# Patient Record
Sex: MALE | Race: WHITE | Employment: FULL TIME | URBAN - METROPOLITAN AREA
[De-identification: names, ages, dates, MRNs, and addresses within clinical notes are randomized per-mention and may not be internally consistent; named-entity substitution may affect disease eponyms.]

---

## 2021-09-08 ENCOUNTER — OFFICE VISIT (OUTPATIENT)
Dept: FAMILY MEDICINE CLINIC | Age: 51
End: 2021-09-08
Payer: COMMERCIAL

## 2021-09-08 VITALS
OXYGEN SATURATION: 97 % | DIASTOLIC BLOOD PRESSURE: 88 MMHG | WEIGHT: 234 LBS | BODY MASS INDEX: 33.5 KG/M2 | RESPIRATION RATE: 18 BRPM | TEMPERATURE: 97.9 F | HEIGHT: 70 IN | HEART RATE: 97 BPM | SYSTOLIC BLOOD PRESSURE: 136 MMHG

## 2021-09-08 DIAGNOSIS — R35.0 URINARY FREQUENCY: ICD-10-CM

## 2021-09-08 DIAGNOSIS — E11.9 NEW ONSET TYPE 2 DIABETES MELLITUS (HCC): Primary | ICD-10-CM

## 2021-09-08 DIAGNOSIS — Z12.5 SCREENING FOR PROSTATE CANCER: ICD-10-CM

## 2021-09-08 DIAGNOSIS — R05.9 COUGH: ICD-10-CM

## 2021-09-08 LAB
BILIRUBIN, POC: ABNORMAL
BLOOD URINE, POC: ABNORMAL
CLARITY, POC: CLEAR
COLOR, POC: ABNORMAL
GLUCOSE URINE, POC: ABNORMAL
HBA1C MFR BLD: 11.1 %
KETONES, POC: ABNORMAL
LEUKOCYTE EST, POC: ABNORMAL
NITRITE, POC: ABNORMAL
PH, POC: 5.5
PROTEIN, POC: ABNORMAL
SPECIFIC GRAVITY, POC: 1.03
UROBILINOGEN, POC: ABNORMAL

## 2021-09-08 PROCEDURE — 81003 URINALYSIS AUTO W/O SCOPE: CPT | Performed by: NURSE PRACTITIONER

## 2021-09-08 PROCEDURE — 83036 HEMOGLOBIN GLYCOSYLATED A1C: CPT | Performed by: NURSE PRACTITIONER

## 2021-09-08 PROCEDURE — 99204 OFFICE O/P NEW MOD 45 MIN: CPT | Performed by: NURSE PRACTITIONER

## 2021-09-08 RX ORDER — GLUCOSAMINE HCL/CHONDROITIN SU 500-400 MG
CAPSULE ORAL
Qty: 100 STRIP | Refills: 5 | Status: SHIPPED | OUTPATIENT
Start: 2021-09-08

## 2021-09-08 RX ORDER — LANCETS 30 GAUGE
EACH MISCELLANEOUS
Qty: 1 EACH | Refills: 5 | Status: SHIPPED | OUTPATIENT
Start: 2021-09-08

## 2021-09-08 RX ORDER — GLIPIZIDE AND METFORMIN HCL 2.5; 5 MG/1; MG/1
1 TABLET, FILM COATED ORAL
Qty: 60 TABLET | Refills: 3 | Status: SHIPPED | OUTPATIENT
Start: 2021-09-08 | End: 2022-01-13

## 2021-09-08 SDOH — ECONOMIC STABILITY: FOOD INSECURITY: WITHIN THE PAST 12 MONTHS, THE FOOD YOU BOUGHT JUST DIDN'T LAST AND YOU DIDN'T HAVE MONEY TO GET MORE.: NEVER TRUE

## 2021-09-08 SDOH — ECONOMIC STABILITY: FOOD INSECURITY: WITHIN THE PAST 12 MONTHS, YOU WORRIED THAT YOUR FOOD WOULD RUN OUT BEFORE YOU GOT MONEY TO BUY MORE.: NEVER TRUE

## 2021-09-08 ASSESSMENT — PATIENT HEALTH QUESTIONNAIRE - PHQ9
SUM OF ALL RESPONSES TO PHQ QUESTIONS 1-9: 0
SUM OF ALL RESPONSES TO PHQ QUESTIONS 1-9: 0
2. FEELING DOWN, DEPRESSED OR HOPELESS: 0
SUM OF ALL RESPONSES TO PHQ9 QUESTIONS 1 & 2: 0
1. LITTLE INTEREST OR PLEASURE IN DOING THINGS: 0
SUM OF ALL RESPONSES TO PHQ QUESTIONS 1-9: 0

## 2021-09-08 ASSESSMENT — SOCIAL DETERMINANTS OF HEALTH (SDOH): HOW HARD IS IT FOR YOU TO PAY FOR THE VERY BASICS LIKE FOOD, HOUSING, MEDICAL CARE, AND HEATING?: NOT HARD AT ALL

## 2021-09-08 ASSESSMENT — ENCOUNTER SYMPTOMS
GASTROINTESTINAL NEGATIVE: 1
WHEEZING: 0
VISUAL CHANGE: 0
BACK PAIN: 0
EYES NEGATIVE: 1
SHORTNESS OF BREATH: 0
COUGH: 1
BLURRED VISION: 0

## 2021-09-08 NOTE — PROGRESS NOTES
Subjective:     Patient ID: Asia Peterson is a 53 y.o. male who presentstoday for:  Chief Complaint   Patient presents with   [address]     Pt. is here to establish care today. Pt. states over the last 3 weeks he has had excessive thirst, urinary frequency, nauseas and sour taste in his mouth. Pt. also has c/o cough, he is a smoker of over 20 years, he c/o chest tightness from coughing. Diabetes  He presents for his initial diabetic visit. There are no hypoglycemic associated symptoms. Pertinent negatives for hypoglycemia include no dizziness or headaches. Associated symptoms include fatigue, polydipsia, polyphagia and polyuria. Pertinent negatives for diabetes include no blurred vision, no chest pain, no foot paresthesias, no foot ulcerations, no visual change, no weakness and no weight loss. There are no hypoglycemic complications. There are no diabetic complications. Risk factors for coronary artery disease include male sex, obesity and tobacco exposure. When asked about current treatments, none were reported. His weight is stable. When asked about meal planning, he reported none. No past medical history on file. No current outpatient medications on file prior to visit. No current facility-administered medications on file prior to visit. No past surgical history on file. No family history on file.   Social History     Socioeconomic History    Marital status:      Spouse name: Not on file    Number of children: Not on file    Years of education: Not on file    Highest education level: Not on file   Occupational History    Not on file   Tobacco Use    Smoking status: Current Every Day Smoker     Packs/day: 1.50     Years: 25.00     Pack years: 37.50     Types: Cigarettes    Smokeless tobacco: Never Used   Substance and Sexual Activity    Alcohol use: Not on file    Drug use: Not on file    Sexual activity: Not on file   Other Topics Concern    Not on file   Social History Narrative    Not on file     Social Determinants of Health     Financial Resource Strain: Low Risk     Difficulty of Paying Living Expenses: Not hard at all   Food Insecurity: No Food Insecurity    Worried About Running Out of Food in the Last Year: Never true    [address] in the Last Year: Never true   Transportation Needs:     Lack of Transportation (Medical):  Lack of Transportation (Non-Medical):    Physical Activity:     Days of Exercise per Week:     Minutes of Exercise per Session:    Stress:     Feeling of Stress :    Social Connections:     Frequency of Communication with Friends and Family:     Frequency of Social Gatherings with Friends and Family:     Attends Sabianism Services:     Active Member of Clubs or Organizations:     Attends Club or Organization Meetings:     Marital Status:    Intimate Partner Violence:     Fear of Current or Ex-Partner:     Emotionally Abused:     Physically Abused:     Sexually Abused: Allergies:  Penicillins    Review of Systems   Constitutional: Positive for fatigue. Negative for activity change, appetite change, chills, diaphoresis, fever, unexpected weight change and weight loss. HENT: Negative. Eyes: Negative. Negative for blurred vision. Respiratory: Positive for cough. Negative for shortness of breath and wheezing. Cardiovascular: Negative for chest pain, palpitations and leg swelling. Gastrointestinal: Negative. Endocrine: Positive for polydipsia, polyphagia and polyuria. Genitourinary: Negative. Musculoskeletal: Negative for back pain, gait problem and joint swelling. Skin: Negative. Neurological: Negative for dizziness, syncope, weakness, light-headedness and headaches. Psychiatric/Behavioral: Negative.         Objective:    /88 (Site: Right Upper Arm, Position: Sitting, Cuff Size: Medium Adult)   Pulse 97   Temp 97.9 °F (36.6 °C) (Oral)   Resp 18   Ht 5' 10\" (1.778 m)   Wt 234 lb (106.1 kg)   SpO2 97%   BMI 33.58 kg/m²     Physical Exam  Constitutional:       General: He is not in acute distress. Appearance: Normal appearance. He is not toxic-appearing. HENT:      Head: Normocephalic and atraumatic. Mouth/Throat:      Mouth: Mucous membranes are moist.   Eyes:      Extraocular Movements: Extraocular movements intact. Conjunctiva/sclera: Conjunctivae normal.      Pupils: Pupils are equal, round, and reactive to light. Cardiovascular:      Rate and Rhythm: Normal rate and regular rhythm. Heart sounds: Normal heart sounds. Pulmonary:      Effort: Pulmonary effort is normal. No respiratory distress. Breath sounds: Normal breath sounds. No stridor. No wheezing, rhonchi or rales. Abdominal:      General: Bowel sounds are normal.      Palpations: Abdomen is soft. Tenderness: There is no abdominal tenderness. Musculoskeletal:         General: Normal range of motion. Cervical back: Normal range of motion and neck supple. No tenderness. Right lower leg: No edema. Left lower leg: No edema. Lymphadenopathy:      Cervical: No cervical adenopathy. Skin:     General: Skin is warm and dry. Findings: No erythema. Neurological:      General: No focal deficit present. Mental Status: He is alert and oriented to person, place, and time. Cranial Nerves: No cranial nerve deficit. Motor: No weakness. Gait: Gait normal.   Psychiatric:         Mood and Affect: Mood normal.         Behavior: Behavior normal.         Thought Content: Thought content normal.         Assessment & Plan:       Diagnosis Orders   1.  New onset type 2 diabetes mellitus (HCC)  POCT glycosylated hemoglobin (Hb A1C)    blood glucose monitor kit and supplies    blood glucose monitor strips    Lancets MISC    glipiZIDE-metFORMIN (METAGLIP) 2.5-500 MG per tablet    CBC Auto Differential    Comprehensive Metabolic Panel    Lipid Panel    [facility] 2. Cough  XR CHEST STANDARD (2 VW)   3. Urinary frequency  POCT glycosylated hemoglobin (Hb A1C)    POCT Urinalysis No Micro (Auto)    Culture, Urine   4. Screening for prostate cancer  PSA Screening     Orders Placed This Encounter   Procedures    Culture, Urine     Standing Status:   Future     Standing Expiration Date:   9/8/2022     Order Specific Question:   Specify (ex-cath, midstream, cysto, etc)? Answer:   MIDSTREAM    XR CHEST STANDARD (2 VW)     Standing Status:   Future     Standing Expiration Date:   9/8/2022    CBC Auto Differential     Standing Status:   Future     Standing Expiration Date:   9/8/2022    Comprehensive Metabolic Panel     Standing Status:   Future     Standing Expiration Date:   9/8/2022    Lipid Panel     Standing Status:   Future     Standing Expiration Date:   9/8/2022     Order Specific Question:   Is Patient Fasting?/# of Hours     Answer:   8    PSA Screening     Standing Status:   Future     Standing Expiration Date:   9/8/2022   [facility], [place]     Referral Priority:   Routine     Referral Type:   Eval and Treat     Referral Reason:   Specialty Services Required     Number of Visits Requested:   1    POCT glycosylated hemoglobin (Hb A1C)    POCT Urinalysis No Micro (Auto)     Orders Placed This Encounter   Medications    blood glucose monitor kit and supplies     Sig: Dispense sufficient amount for indicated testing frequency plus additional to accommodate PRN testing needs. Dispense all needed supplies to include: monitor, strips, lancing device, lancets, control solutions, alcohol swabs. Dispense:  1 kit     Refill:  0     Brand per patient preference. May round up to next available package size.  blood glucose monitor strips     Sig: Test 1 times a day & as needed for symptoms of irregular blood glucose. Dispense sufficient amount for indicated testing frequency plus additional to accommodate PRN testing needs.      Dispense:  100 strip Refill:  5     Brand per patient preference. May round up to next available package size.  Lancets MISC     Sig: DX: diabetes mellitus. Use 1-3 time(s) daily - Ok to substitute per insurance (1 each = 1 box)     Dispense:  1 each     Refill:  5    glipiZIDE-metFORMIN (METAGLIP) 2.5-500 MG per tablet     Sig: Take 1 tablet by mouth 2 times daily (before meals)     Dispense:  60 tablet     Refill:  3     There are no discontinued medications. Return in about 4 weeks (around 10/6/2021). Reviewed with the patient: currentclinical status, medications, activities and diet. Side effects, adverse effects of the medicationprescribed today, as well as treatment plan/ rationale and result expectations havebeen discussed with the patient who expresses understanding and desires to proceed. Pt instructions reviewed and given to patient.     Close follow up to evaluate treatment resultsand for coordination of care. I have reviewed the patient's medical historyin detail and updated the computerized patient record.     Victor Manuel Stahl, APRN - CNP

## 2021-09-14 DIAGNOSIS — R06.02 SOB (SHORTNESS OF BREATH): Primary | ICD-10-CM

## 2021-09-28 ENCOUNTER — OFFICE VISIT (OUTPATIENT)
Dept: FAMILY MEDICINE CLINIC | Age: 51
End: 2021-09-28
Payer: COMMERCIAL

## 2021-09-28 ENCOUNTER — HOSPITAL ENCOUNTER (OUTPATIENT)
Dept: DIABETES SERVICES | Age: 51
Setting detail: THERAPIES SERIES
Discharge: HOME | End: 2021-09-28
Payer: COMMERCIAL

## 2021-09-28 VITALS
HEART RATE: 80 BPM | BODY MASS INDEX: 34.65 KG/M2 | RESPIRATION RATE: 18 BRPM | TEMPERATURE: 98 F | WEIGHT: 242 LBS | DIASTOLIC BLOOD PRESSURE: 78 MMHG | HEIGHT: 70 IN | SYSTOLIC BLOOD PRESSURE: 124 MMHG | OXYGEN SATURATION: 97 %

## 2021-09-28 VITALS — WEIGHT: 242 LBS | BODY MASS INDEX: 34.65 KG/M2 | HEIGHT: 70 IN

## 2021-09-28 DIAGNOSIS — E11.9 TYPE 2 DIABETES MELLITUS WITHOUT COMPLICATION, WITHOUT LONG-TERM CURRENT USE OF INSULIN (HCC): Primary | ICD-10-CM

## 2021-09-28 PROCEDURE — G0108 DIAB MANAGE TRN  PER INDIV: HCPCS

## 2021-09-28 PROCEDURE — 90471 IMMUNIZATION ADMIN: CPT | Performed by: NURSE PRACTITIONER

## 2021-09-28 PROCEDURE — 90674 CCIIV4 VAC NO PRSV 0.5 ML IM: CPT | Performed by: NURSE PRACTITIONER

## 2021-09-28 PROCEDURE — 99213 OFFICE O/P EST LOW 20 MIN: CPT | Performed by: NURSE PRACTITIONER

## 2021-09-28 SDOH — ECONOMIC STABILITY: FOOD INSECURITY: ADDITIONAL INFORMATION: NO

## 2021-09-28 ASSESSMENT — ENCOUNTER SYMPTOMS
GASTROINTESTINAL NEGATIVE: 1
BACK PAIN: 0
WHEEZING: 0
SHORTNESS OF BREATH: 0
EYES NEGATIVE: 1
COUGH: 0

## 2021-09-28 ASSESSMENT — PROBLEM AREAS IN DIABETES QUESTIONNAIRE (PAID)
FEELING DEPRESSED WHEN YOU THINK ABOUT LIVING WITH DIABETES: 0
COPING WITH COMPLICATIONS OF DIABETES: 0
FEELING SCARED WHEN YOU THINK ABOUT LIVING WITH DIABETES: 1
FEELING THAT DIABETES IS TAKING UP TOO MUCH OF YOUR MENTAL AND PHYSICAL ENERGY EVERY DAY: 0
PAID-5 TOTAL SCORE: 3
WORRYING ABOUT THE FUTURE AND THE POSSIBILITY OF SERIOUS COMPLICATIONS: 2

## 2021-09-28 ASSESSMENT — PATIENT HEALTH QUESTIONNAIRE - PHQ9
2. FEELING DOWN, DEPRESSED OR HOPELESS: 0
SUM OF ALL RESPONSES TO PHQ9 QUESTIONS 1 & 2: 0
SUM OF ALL RESPONSES TO PHQ QUESTIONS 1-9: 0
1. LITTLE INTEREST OR PLEASURE IN DOING THINGS: 0

## 2021-09-28 ASSESSMENT — SLEEP AND FATIGUE QUESTIONNAIRES
HOW DO YOU RATE THE QUALITY OF YOUR SLEEP: FAIR
HAVE YOU EVER BEEN TESTED FOR SLEEP APNEA: NO
HOW MANY HOURS OF SLEEP ARE YOU GETTING, ON AVERAGE: LESS THAN 7
HAVE YOU BEEN TOLD, OR NOTICED ON YOUR OWN, THAT YOU STOP BREATHING OR STRUGGLE TO BREATHE IN YOUR SLEEP: NO

## 2021-09-28 NOTE — PROGRESS NOTES
Subjective:     Patient ID: Vini Romano is a 49 y.o. male who presentstoday for:  Chief Complaint   Patient presents with    Diabetes     Pt. is here for a 5 week f/u on newly diagnosed DM. Pt. has a log of his sugars. Pt. states he has the DM diabetic class this afternoon. Diabetes  He presents for his follow-up diabetic visit. He has type 2 diabetes mellitus. His disease course has been improving. Hypoglycemia symptoms include headaches. Pertinent negatives for hypoglycemia include no dizziness. There are no diabetic associated symptoms. Pertinent negatives for diabetes include no chest pain, no fatigue, no polydipsia, no polyphagia, no polyuria and no weakness. There are no hypoglycemic complications. Risk factors for coronary artery disease include male sex and obesity. Current diabetic treatment includes oral agent (dual therapy) and diet. He is compliant with treatment all of the time. His weight is stable. Meal planning includes avoidance of concentrated sweets. He monitors blood glucose at home 1-2 x per day. Blood glucose monitoring compliance is excellent. His home blood glucose trend is decreasing steadily. His overall blood glucose range is 140-180 mg/dl. No past medical history on file. Current Outpatient Medications on File Prior to Visit   Medication Sig Dispense Refill    blood glucose monitor kit and supplies Dispense sufficient amount for indicated testing frequency plus additional to accommodate PRN testing needs. Dispense all needed supplies to include: monitor, strips, lancing device, lancets, control solutions, alcohol swabs. 1 kit 0    blood glucose monitor strips Test 1 times a day & as needed for symptoms of irregular blood glucose. Dispense sufficient amount for indicated testing frequency plus additional to accommodate PRN testing needs. 100 strip 5    Lancets MISC DX: diabetes mellitus.  Use 1-3 time(s) daily - Ok to substitute per insurance (1 each = 1 box) 1 each 5    glipiZIDE-metFORMIN (METAGLIP) 2.5-500 MG per tablet Take 1 tablet by mouth 2 times daily (before meals) 60 tablet 3     No current facility-administered medications on file prior to visit. No past surgical history on file. No family history on file. Social History     Socioeconomic History    Marital status:      Spouse name: Not on file    Number of children: Not on file    Years of education: Not on file    Highest education level: Not on file   Occupational History    Not on file   Tobacco Use    Smoking status: Current Every Day Smoker     Packs/day: 1.50     Years: 25.00     Pack years: 37.50     Types: Cigarettes    Smokeless tobacco: Never Used   Substance and Sexual Activity    Alcohol use: Not on file    Drug use: Not on file    Sexual activity: Not on file   Other Topics Concern    Not on file   Social History Narrative    Not on file     Social Determinants of Health     Financial Resource Strain: Low Risk     Difficulty of Paying Living Expenses: Not hard at all   Food Insecurity: No Food Insecurity    Worried About Running Out of Food in the Last Year: Never true    [place] of Food in the Last Year: Never true   Transportation Needs:     Lack of Transportation (Medical):  Lack of Transportation (Non-Medical):    Physical Activity:     Days of Exercise per Week:     Minutes of Exercise per Session:    Stress:     Feeling of Stress :    Social Connections:     Frequency of Communication with Friends and Family:     Frequency of Social Gatherings with Friends and Family:     Attends Yarsanism Services:     Active Member of Clubs or Organizations:     Attends Club or Organization Meetings:     Marital Status:    Intimate Partner Violence:     Fear of Current or Ex-Partner:     Emotionally Abused:     Physically Abused:     Sexually Abused:       Allergies:  Penicillins    Review of Systems   Constitutional: Negative for activity change, appetite change, chills, diaphoresis, fatigue, fever and unexpected weight change. HENT: Negative. Eyes: Negative. Respiratory: Negative for cough, shortness of breath and wheezing. Cardiovascular: Negative for chest pain, palpitations and leg swelling. Gastrointestinal: Negative. Endocrine: Negative for polydipsia, polyphagia and polyuria. Genitourinary: Negative. Musculoskeletal: Negative for back pain, gait problem and joint swelling. Skin: Negative. Neurological: Positive for headaches. Negative for dizziness, syncope, weakness and light-headedness. Psychiatric/Behavioral: Negative. Objective:    /78 (Site: Right Upper Arm, Position: Sitting, Cuff Size: Large Adult)   Pulse 80   Temp 98 °F (36.7 °C) (Oral)   Resp 18   Ht 5' 10\" (1.778 m)   Wt 242 lb (109.8 kg)   SpO2 97%   BMI 34.72 kg/m²     Physical Exam  Constitutional:       General: He is not in acute distress. Cardiovascular:      Rate and Rhythm: Normal rate. Pulmonary:      Effort: Pulmonary effort is normal.   Neurological:      Mental Status: He is alert and oriented to person, place, and time. Psychiatric:         Behavior: Behavior normal.         Assessment & Plan:       Diagnosis Orders   1. Type 2 diabetes mellitus without complication, without long-term current use of insulin ([facility] [address]       Orders Placed This Encounter   Procedures    INFLUENZA, MDCK QUADV, 2 YRS AND OLDER, IM, PF, PREFILL SYR OR SDV, 0.5ML (FLUCELVAX QUADV, PF)       Pt to continue to keep track of sugar. Let me know if they drop down below 70 again and I will adjust medication otherwise f/u in 2 months to repeat a1c and lipids    No orders of the defined types were placed in this encounter. There are no discontinued medications. Return in about 2 months (around 11/28/2021). Reviewed with the patient: currentclinical status, medications, activities and diet.      Side effects, adverse effects of the medicationprescribed today, as well as treatment plan/ rationale and result expectations havebeen discussed with the patient who expresses understanding and desires to proceed. Pt instructions reviewed and given to patient.     Close follow up to evaluate treatment resultsand for coordination of care. I have reviewed the patient's medical historyin detail and updated the computerized patient record.     Victor Manuel Stahl, APRN - CNP

## 2021-09-28 NOTE — PROGRESS NOTES
Diabetes Self- Management Education Program Assessment and Education Record-   Also see Diabetic Screening    Patient, Naheed Bass, has been diagnosed with  [] Type 1 [x] Type 2 diabetes. [x] Patient is new to diabetes, and here for initial diabetes self-management assessment and education. [] Patient is here for review of diabetes self-management assessment and education. Today's visit was in an [x] individual [] group setting. Patient came [] alone [] with family member. Goals setting:  Initial Goal Set with Patient  [x]Yes  [] NO      MEDICAL HISTORY:  Past Medical History:   Diagnosis Date    Diabetes mellitus ([facility] [address]      Family History   Problem Relation Age of Onset    Diabetes Neg Hx      Penicillins   Immunization History   Administered Date(s) Administered    COVID-19, Moderna, PF, 100mcg/0.5mL 03/19/2021, 04/17/2021    Influenza, MDCK Quadv, IM, PF (Flucelvax 2 yrs and older) 09/28/2021       Current Medications  Current Outpatient Medications   Medication Sig Dispense Refill    blood glucose monitor kit and supplies Dispense sufficient amount for indicated testing frequency plus additional to accommodate PRN testing needs. Dispense all needed supplies to include: monitor, strips, lancing device, lancets, control solutions, alcohol swabs. 1 kit 0    blood glucose monitor strips Test 1 times a day & as needed for symptoms of irregular blood glucose. Dispense sufficient amount for indicated testing frequency plus additional to accommodate PRN testing needs. 100 strip 5    Lancets MISC DX: diabetes mellitus. Use 1-3 time(s) daily - Ok to substitute per insurance (1 each = 1 box) 1 each 5    glipiZIDE-metFORMIN (METAGLIP) 2.5-500 MG per tablet Take 1 tablet by mouth 2 times daily (before meals) 60 tablet 3     No current facility-administered medications for this encounter.   :     Comments:  Allergies:     Allergies   Allergen Reactions    Penicillins Rash       Diabetes 5  / Health Status    1. A1C blood level - at goal < 7%   Lab Results   Component Value Date    LABA1C 11.1 2021     Lab Results   Component Value Date    LDLCALC 133 (H) 2021    CREATININE 0.87 2021       2. Blood pressure (140/90)  Or less  BP Readings from Last 3 Encounters:   21 124/78   21 136/88       3. Cholesterol ( LDL under  100)   Lab Results   Component Value Date    LDLCALC 133 (H) 2021       4. Smoking ? [x]Yes   []No    5. Taking an Asprin daily? []Yes   [x]No      Diabetes Self- Management Education Record    Participant Name: Jignesh Hazel  Referring Provider: FANNY Oneill CNP   Assessment/Evaluation Ratings:  1=Needs Instruction   4=Demonstrates Understanding/Competency  2=Needs Review   NC=Not Covered    3=Comprehends Key Points  N/A=Not Applicable    Topics/Learning Objectives Initial Assessment Date:  Comments:  Signature:   Classes 1, 2, 3 or Individual instruction Instr. Date:  Comment:   Signature:   Reinforce Date:  Comments:  Signature: Post- Education Eval date:  Comments:  Signature:   Pathophysiology of diabetes  Define diabetes & Insulin resistance Identify own type of diabetes; role of the pancreas; signs/symptoms; diagnostic criteria; prevention & treatment options; contributing factors. Assessment Ratin  21  Diabetes Education assessment completed today. Patient has:  [] No knowledge of diabetes disease process   [x] Limited knowledge of diabetes disease process  [] Good knowledge of diabetes disease process. In this session, the role of the pancreas, insulin, and the liver in glucose utilization and insulin resistance was   [x]  Introduced  []  Reviewed. [x] ADA booklet Where Do I Begin used to reinforce teaching. Yuliana Castillo RN   [] Discussed basic pathophysiology of diabetes with the group including the pancreas, insulin, insulin resistance and the liver's involvement.      [] Reviewed the different types of DM, risk factors, diagnosis, symptoms and the treatment options. Evaluation rating:  Recent Health problems:  []Yes []No   Being Active  Verbalize effect of exercise on blood glucose levels; benefits of regular exercise; safety considerations; contraindications; maintenance of activity. Assessment Ratin21    Patient is   [] currently being active daily  [x] not currently being active daily. [x] Reviewed effects of exercise on glycemic control, risks and benefits, precautions. Patient       []has co- morbidities      [x] has no co-morbidities that recommend being seen by [facility] prior to starting exercise program for medical clearance. [x] Briefly reviewed guidelines for frequency, duration, intensity for exercise. Patient currently engages in the following activities:nothing    Marck Negron RN     [] Reviewed effects of activity on glycemic control and weight loss, risks and benefits, and precautions. [] Current recommendations for being active by the American Diabetes Association reviewed. [] Discussed what patient is doing to stay active   Evaluation rating:  Have you made any changes in your activity level? []Yes []No       If yes, how? If yes, how many days/week? Monitoring blood glucose Identify recommended & personal blood glucose targets; importance of testing; testing supplies; HgbA1C target levels; Factors affecting blood glucose; Importance of logging blood glucose levels for pattern recognition; ketone testing; safe lancet disposal..   Assessment Ratin21  [] Proficient in using meter   [x] Recently started using meter  [] Not currently using meter  [] Patient does not have a meter prescribed & advised to contact PCP for order or to purchase meter. [x] Reviewed handout Desired blood glucose level citing ADA recommendations for blood glucose goals.  Discussed frequency of testing, importance of record keeping, proper handling of meter and test strips, disposal of used strips and lancets. Reviewed importance of testing as a means to evaluate effectiveness of treatment plan. [x] Does not have any Anemias or  hemoglobinopathies that may affect A1c  [] Has anemia or hemoglobinopathy that may affect A1c    Jamila Hedrick RN   [] Discussed blood glucose monitoring to include: American Diabetes Association goals for blood glucose, effects of diet, exercise and medications on test results, frequency of testing, the possible causes and appropriate action to take if hypoglycemia (15/15 rule) or hyperglycemia occurs,   importance of record keeping to share with their PCP and when to call their PCP with abnormal results. [] Also reviewed were: proper storage and handling of both the meter and test strips; proper disposal of used strips and lancets, running  checks on the test strips using control solution and loading and using lancet device to obtain an adequate sample. Suggested times were given for routine testing. To increase testing for sick day monitoring, or when a change in diabetes medication, activity, or stress occurs. []  Aware of individualized A1C goal and current A1C. [] Checking for ketones was introduced along with prevention and symptoms of Diabetic Ketoacidosis (DKA)    Evaluation rating:    Checking blood sugar    times a day. Checking before meals? []Yes  []No     Fasting ranges in last week:      Checking 2 hours post prandial? []Yes  []No     Ranges in last week:     Checking at bedtime? []Yes  []No   Ranges in last week:     Knowledgeable of blood glucose goals? []Yes []No             Glucose meter - educate on meter use if new to monitoring.  Able to use meter appropriately   Assessment Ratin  21  [x] Patient is new to glucose meter and instructed on the following.:   []Overview of meter - 800 number on all machines for help  []Proper storage/ handling of meter and test strips  []Washing hands, turning on meter - setting date and time  []Running  checks on the test strips using control solution   []Loading and using lancet device to obtain an adequate sample  []Obtaining blood sample and reading results on meter  []Proper disposal of used strips and lancets  []Frequency of testing  Importance of record keeping   []When to call their PCM with abnormal results  []Desired blood glucose goals for optimal control - handout given  [x] All of the above    [] Patient instructed on home meter. Name of meter:    [] Patient instructed with demonstrator model in office. The patient return demonstrated   [] Verbally   [] Using his own meter:        [] Self-tested Bg:_____    Travis Samson RN   See above  Evaluation rating:    Cleaning hands before testing? []Yes   []No    Using sides of fingers, not pads? []Yes   []No    Using  checks. []Yes   []No    Logging resuts? []Yes   []No   Risk Reduction - acute complications:  Identify symptoms of hyper & hypoglycemia, and prevention & treatment strategies. Assessment Ratin  21  [] Knowledgeable  [x] Limited Knowledge  [] No knowledge   of hypo or hyperglycemia. [x] Handouts reviewed covering symptoms and treatment for both. Patient has []low [x]high risk for hypoglycemia. [x] Advised to carry source of fast acting glucose at all times. []  Advised to carry ID on person identifying as having diabetes  rTavis Samson RN   [] Reviewed signs and symptoms of acute complications of diabetes, (hypoglycemia and hyperglycemia), possible causes and actions to take if occurs. [] Reviewed BG guidelines and troubleshooting unexpected numbers. Evaluation rating:    Knowledge of Hypo and Hyper glycemia treatment []Yes []No     Knowledge of Hypo and Hyper glycemia prevention []Yes []No    Lows since last visit? []Yes []No      Treat appropriately? []Yes  []No    Explainable?    []Yes  []No      Ketone testing? []Yes []No   Risk Reduction - sick days   Describe sick day guidelines & indications for physician notification. Identify short term consequences of poor control. Assessment Ratin21  NC   [] Advised of effects of illness on blood glucose. Reviewed management of sick days with group. Advised about importance of continuing diabetes medications, tips for staying hydrated and when to call the doctor. [] Sick day handout given.     [] Sick day toolbox reviewed. Evaluation rating:    Knowledge of sick day plan? []Yes [] No   Healthy Coping:   Identify healthy and unhealthy coping, causes of stress and ways to reduce stress. How depression affects diabetes care and how to seek help if needed. Identify support needed & support network available   Assessment Ratin  21    Patient's PAID-5 Score:3    [x]Patient's PAID-5 (Problem Areas in Diabetes) score is less than 9. No intervention needed at this time. [] Patient's PAID-5 (Problem Areas in Diabetes) score is greater than 8 which indicates possible diabetes related emotional distress and warrants further assessment. [] Support given during appointment. Patient advised to follow up with PCP or psychologist.   [] Letter will be sent to PCP indicating further assessment needed. Lucila Quintana RN   [] Reviewed healthy coping and unhealthy coping with the group. Discussed what ways of coping each person usually uses and brainstormed ways to change to a healthy coping mechanism with the group. [] Stressed the importance of stress reduction and the negative effects of stress on the body including elevated BG.     [] Community resources and support networks reviewed and handout provided. Evaluation rating:    Feelings/Attitudes/Concerns? Ongoing self-management support plan?   [] Yes []No   Problem solving & goal setting:  Behavior Change and goal settingIdentify 7 self-care behaviors, problem solving techniques: Ratin  21  Handouts provided on both oral and injectable medications. Currently taking: metaglip BID. [] Currently takes the following complementary or alternative medicines:    [x] Reviewed medication compliance, action, side effects and timing of each. Patient is:  [x]compliant with current meds. []noncompliant with current meds. Minoo Mojica RN   [] Discussed all medication classes both oral and injectable to include method of action, side effects and precautions. [] Patient provided handout with their medications for diabetes listed showing method of action and when to take. Evaluation rating:    Any Changes in Medications? Compliant? []Yes []No     Any side effects? []Yes [] No   Insulin / Injectable - Appropriate injection sites; proper storage; supplies needed; proper technique; safe needle disposal guidelines. Assessment Ratin  21    [x] Not on insulin      Minoo Mojica RN   [] Discussed insulin stigma and proper technique including site selection and self-injection. []  Reviewed both pen and vial/syringe use. Discussed needle disposal and proper insulin storage and importance of times to take insulin. [] Discussed importance of blood glucose monitoring to assess current treatment effectiveness. Evaluation rating: On Insulin? []Yes []No           Injection site used: __________     Rotating sites? []Yes [] No     Problems? []Yes []No      Storing insulin correctly? []Yes [] No     Injecting at proper times? []Yes []No   IRisk Reduction - chronic complications:  Describe the relationship of blood glucose levels to long term complications of diabetes. Identify preventative measures & standards of care. Assessment Ratin21  NC  See Diabetes Assessment for last completed chronic complication prevention appointments.     [x] Patient is up-to-date on preventative exams and vaccines  [] Patient is not up-to date on preventative exams and vaccines and advised to discuss with PCM    [x] Blood pressure is at goal  [] Blood pressure is not at goal    [] Cholesterol is at goal  [x] Cholesterol is not at goal    [] Has microvascular complications  [] Has macrovascular complications  Nishi Patel RN   [] Reviewed natural course of T2DM with group and how chronic complications are related to elevated blood glucose. [] Discussed macro and microvascular complications and the need for control of lipids, blood pressure, glucose levels, weight reduction and smoking cessation to help reduce risks. [] Instructed on goals for blood pressure, lipids, and glucose for optimal health. [] Individualized scorecard provided with current   health maintenance recommendations from the American Diabetes Association to include Eye, dental exams, foot exams, recommended labs and vaccines for people with diabetes. Evaluation rating:    Using Score card? [] Yes []No    Blood Pressure at goal?  [] Yes []No    Foot exams:  self-exams daily  []Yes []No  Provider yearly   []  Yes []No               Eye and dental exams up to date? []Yes []No               Labs completed & at goal  [] Yes []No        Plan if not at goal? []Yes []No  Immunizations   [] Flu   []pneumonia   []Hep B (19-59)   []Covid   Individualized goal selection. 09/28/21  My goal , to help me improve my health, I will:     1. Begin to carb count. 2. Schedule appt with dietitian for meal planning. Nishi Patel RN Percentage of goal completed from Initial Assessment:  1.  %      2.    %    New revised goal: Percentage of goal completed:  1.      2.    New revised goal: Percentage of goal completed since end of class:  1.      2.    New revised goal:     Plan  Follow-up Appointments planned in individual setting.      Next Appointment TBD by RD    Instruction Method: [x]Lecture/Discussion  []Power Point Presentation  [x]Handouts  []Return Demonstration      Education Materials/Equipment Provided:      [x] Self-Management  - Initial Assessment - Where do I Begin booklet and Counting Carbs from the ADA. [] Self-Management  Class 1 - On the Road to better managing your diabetes - Packet of Handouts from Diabetes Department    [] Self-Management  Class 2 - Meal Plan,  Choose your Foods - Food Lists for [employer] and Nutrition in the [employer] - fast facts about fast food    [] Self-Management  Class 3 -  Diabetes ID card,  foot care tips sheet,  Continuing Your Journey with Diabetes, Individualized Diabetes report card, Sick Day Rules, Diabetes Cookbooks, Magazines and Pedometers when available    [] Glucose Meter     [] BD Getting Started Insulin Kit     [x] Other  - sample meal plan    Encounter Type Date Start Time End Time Comments No Show Dates   Assessment 09/28/21  Scottie Sagastume,  [phone] 09/28/21    [x] Patient has no barriers to learning and recommend attending classes. Classes scheduled for: unable to schedule at this time    [] Patient has the following barriers to learning and would benefit from additional education in an individual setting. Barriers:      [] Will follow up:    [x] Patient declines classes at this time. [x] Will follow up: one month after appt with NATA Sagastume RN    Class 1 - Understanding diabetes      [] First class completed today. Class 2- Nutrition and diabetes        [] Second class completed today    Class 3 - Preventing Complications     [] Third class completed today. [] Patient has completed all 3 classes today. Goal sheets and DSMS Support Plan completed. Will follow up in 3-4 months via telephone. Patient advised to contact Diabetes Education office if any questions. Number provided. [] Patient needs to attend Class #    in order to complete classes. Scheduled for:        Individual MNT         3 Month Follow-up      []In Person  []Telephone    Meter Instrx Insulin Instrx      []Pen  []Vial & Syringe      DSMS Support Plan:  Follow-up plan:     [] MNT referral request / Appointment      [] Annual update referral request and appointment after      [] ADA  Where do I Begin, Living with Type 2 Diabetes ADA home support program     [] [address] [place] [address] [phone]     [] Fit Walks : FREE [facility] or [address] [place] [address]     [] Diabetes Support Group      [] Emotional Support      [] Gino on phone      [] Internet web sites - ADA and D- life    []  Journals/Magazines    []  Other ___________________________      Post Education Referrals:        [] [address] [facility] information sheet and [address] [facility] , 21       [x] Dental care     [] Podiatrist     [] Ophthalmologist     [] Other    Felice Lozano, RN

## 2021-09-28 NOTE — PROGRESS NOTES
Vaccine Information Sheet, \"Influenza - Inactivated\"  given to [place], or parent/legal guardian of  Loida and verbalized understanding. Patient responses:    Have you ever had a reaction to a flu vaccine? No  Are you able to eat eggs without adverse effects? Yes  Do you have any current illness? No  Have you ever had [place] Syndrome? No    Flu vaccine given per order. Please see immunization tab.

## 2021-10-14 ENCOUNTER — HOSPITAL ENCOUNTER (OUTPATIENT)
Dept: NUTRITION | Age: 51
Discharge: HOME | End: 2021-10-14
Payer: COMMERCIAL

## 2021-10-14 PROCEDURE — 97802 MEDICAL NUTRITION INDIV IN: CPT

## 2021-10-14 NOTE — PROGRESS NOTES
Andrei Arreguin is a 49 y.o.  male     Reason for Counseling: New onset DM2    Subjective/Current Data:  Pt presents with newly dx type 2 DM. Pt is from home with his 10 y.o. daughter and admits to getting no exercise and that he does not cook. He has been trying to make changes since his dx and states that he was eating fast food daily, drinking damien sweetened ice tea and monster energy drinks which he completely eliminated. He rarely drinks alcohol, drinks 2-3 bottles of water daily, states he believes he has a slight allergy to raw fruit/veggies (itching), and that he eats 2-3 meals per day. 24 hour recall includes: Breakfast: low carb cereal; Lunch: salad from fast food place; Dinner: frozen meal; snack: trail mix. Pt explains that he has been very hungry and has had low energy and that he has been struggling with what/how much he is supposed to be eating. Past Medical History:  Past Medical History:   Diagnosis Date    Diabetes mellitus ([facility] [place] [address]      No past surgical history on file. Labs:    Chemistry CBC/Coags Misc. No results for input(s): NA, K, CL, CO2, BUN, CREATININE, GLUCOSE in the last 72 hours. Invalid input(s): CA  No results for input(s): PHOS in the last 72 hours. No results for input(s): WBC, RBC, HGB, HCT, MCV, MCH, MCHC, RDW, PLT, MPV in the last 72 hours. No results for input(s): LABALBU in the last 72 hours. Invalid input(s):  PREALBUMIN  Lab Results   Component Value Date    LABA1C 11.1 09/08/2021            Anthropometric Measures:  Height: 5'10\"  Weight: 242#  Ideal Body Weight: 166#  Ideal Body Weight %: >100%  BMI: 34.72 Obese Class I    Wt Readings from Last 20 Encounters:   09/28/21 242 lb (109.8 kg)   09/28/21 242 lb (109.8 kg)   09/08/21 234 lb (106.1 kg)       Assessment and Plan: I instructed the pt on basic diabetic diet guidelines.  We discussed carb counting, foods that contained carbs, and how to incorporate the correct portions in each meals. I reviewed the importance of not skipping meals. Emphasis was placed on having a set meal schedule, water intake, and getting regular physical activity. Together we practiced counting carbs and coming up with a meal plan that works best with the pt's lifestyle. Pt was receptive to education and asked appropriate questions. Readiness to change is high. Nutritional Requirements:  Estimated Energy Needs:  Weight Used: 109.8kg   Method Used: MS  Estimated kcal Needs:1600-1700kcal per day  Protein Needs:  Weight used: 75.4kg  1 g/kg = 75 g per day    Nutrition Diagnosis and Goal  Problem: Food and nutrition related knowledge deficit  Etiology/related to: no prior nutrition education   Symptoms/Signs/as evidenced by: newly dx DM2      Goal:   1) eat 3-4 servings of carbs per meal and 2 servings for evening snack  2) Walk for 15 mins 3x per week  3) Pack lunch 2 x per week    Education Needs: Provided Diabetic guidelines, food label instructions, 1600 5 day menu, DM snacks      NUTRITION RECOMMENDATIONS / MONITORING / EVALUATION  1. Name and Office phone number given for reference.     Carmita Hood, MS, RD, LD

## 2021-10-28 ENCOUNTER — TELEPHONE (OUTPATIENT)
Dept: DIABETES SERVICES | Age: 51
End: 2021-10-28

## 2021-11-30 ENCOUNTER — OFFICE VISIT (OUTPATIENT)
Dept: FAMILY MEDICINE CLINIC | Age: 51
End: 2021-11-30
Payer: COMMERCIAL

## 2021-11-30 VITALS
DIASTOLIC BLOOD PRESSURE: 80 MMHG | TEMPERATURE: 98.1 F | RESPIRATION RATE: 18 BRPM | OXYGEN SATURATION: 98 % | SYSTOLIC BLOOD PRESSURE: 128 MMHG | WEIGHT: 238 LBS | BODY MASS INDEX: 34.07 KG/M2 | HEART RATE: 76 BPM | HEIGHT: 70 IN

## 2021-11-30 DIAGNOSIS — E11.9 TYPE 2 DIABETES MELLITUS WITHOUT COMPLICATION, WITHOUT LONG-TERM CURRENT USE OF INSULIN (HCC): Primary | ICD-10-CM

## 2021-11-30 LAB — HBA1C MFR BLD: 6.5 %

## 2021-11-30 PROCEDURE — 99213 OFFICE O/P EST LOW 20 MIN: CPT | Performed by: NURSE PRACTITIONER

## 2021-11-30 PROCEDURE — 83036 HEMOGLOBIN GLYCOSYLATED A1C: CPT | Performed by: NURSE PRACTITIONER

## 2021-11-30 ASSESSMENT — ENCOUNTER SYMPTOMS
BACK PAIN: 0
COUGH: 0
SHORTNESS OF BREATH: 0
WHEEZING: 0
EYES NEGATIVE: 1
GASTROINTESTINAL NEGATIVE: 1

## 2021-11-30 NOTE — PROGRESS NOTES
Subjective:     Patient ID: Quiana Silva is a 49 y.o. male who presentstoday for:  Chief Complaint   Patient presents with    Diabetes     Pt. is here for a 2 month f/u on new onset DM. Pts. last A1C was done on 09/08/2021 and was 11.1. Pt. has been checking his sugars at home. Diabetes  He presents for his follow-up diabetic visit. He has type 2 diabetes mellitus. His disease course has been improving. There are no hypoglycemic associated symptoms. Pertinent negatives for hypoglycemia include no dizziness or headaches. There are no diabetic associated symptoms. Pertinent negatives for diabetes include no chest pain, no fatigue, no polydipsia, no polyphagia, no polyuria and no weakness. There are no hypoglycemic complications. Risk factors for coronary artery disease include male sex and obesity. Current diabetic treatment includes oral agent (dual therapy) and diet. He is compliant with treatment all of the time. His weight is stable. Meal planning includes avoidance of concentrated sweets. He monitors blood glucose at home 1-2 x per day. Blood glucose monitoring compliance is excellent. His home blood glucose trend is decreasing steadily. His overall blood glucose range is 110-130 mg/dl. Past Medical History:   Diagnosis Date    Diabetes mellitus ([facility] [address]      Current Outpatient Medications on File Prior to Visit   Medication Sig Dispense Refill    blood glucose monitor kit and supplies Dispense sufficient amount for indicated testing frequency plus additional to accommodate PRN testing needs. Dispense all needed supplies to include: monitor, strips, lancing device, lancets, control solutions, alcohol swabs. 1 kit 0    blood glucose monitor strips Test 1 times a day & as needed for symptoms of irregular blood glucose. Dispense sufficient amount for indicated testing frequency plus additional to accommodate PRN testing needs. 100 strip 5    Lancets MISC DX: diabetes mellitus.  Use 1-3 time(s) daily - Ok to substitute per insurance (1 each = 1 box) 1 each 5    glipiZIDE-metFORMIN (METAGLIP) 2.5-500 MG per tablet Take 1 tablet by mouth 2 times daily (before meals) 60 tablet 3     No current facility-administered medications on file prior to visit. No past surgical history on file. Family History   Problem Relation Age of Onset    Diabetes Neg Hx      Social History     Socioeconomic History    Marital status:      Spouse name: Not on file    Number of children: Not on file    Years of education: Not on file    Highest education level: Not on file   Occupational History    Not on file   Tobacco Use    Smoking status: Current Every Day Smoker     Packs/day: 1.50     Years: 25.00     Pack years: 37.50     Types: Cigarettes    Smokeless tobacco: Never Used   Substance and Sexual Activity    Alcohol use: Not on file    Drug use: Not on file    Sexual activity: Not on file   Other Topics Concern    Not on file   Social History Narrative    Not on file     Social Determinants of Health     Financial Resource Strain: Low Risk     Difficulty of Paying Living Expenses: Not hard at all   Food Insecurity: No Food Insecurity    Worried About Running Out of Food in the Last Year: Never true    Pippa of Food in the Last Year: Never true   Transportation Needs:     Lack of Transportation (Medical): Not on file    Lack of Transportation (Non-Medical):  Not on file   Physical Activity:     Days of Exercise per Week: Not on file    Minutes of Exercise per Session: Not on file   Stress:     Feeling of Stress : Not on file   Social Connections:     Frequency of Communication with Friends and Family: Not on file    Frequency of Social Gatherings with Friends and Family: Not on file    Attends Druze Services: Not on file    Active Member of Clubs or Organizations: Not on file    Attends Club or Organization Meetings: Not on file    Marital Status: Not on file   Intimate Partner Violence:  Fear of Current or Ex-Partner: Not on file    Emotionally Abused: Not on file    Physically Abused: Not on file    Sexually Abused: Not on file   Housing Stability:     Unable to Pay for Housing in the Last Year: Not on file    Number of Places Lived in the Last Year: Not on file    Unstable Housing in the Last Year: Not on file     Allergies:  Penicillins    Review of Systems   Constitutional: Negative for activity change, appetite change, chills, diaphoresis, fatigue, fever and unexpected weight change. HENT: Negative. Eyes: Negative. Respiratory: Negative for cough, shortness of breath and wheezing. Cardiovascular: Negative for chest pain, palpitations and leg swelling. Gastrointestinal: Negative. Endocrine: Negative for polydipsia, polyphagia and polyuria. Genitourinary: Negative. Musculoskeletal: Negative for back pain, gait problem and joint swelling. Skin: Negative. Neurological: Negative for dizziness, syncope, weakness, light-headedness and headaches. Psychiatric/Behavioral: Negative. Objective:    /80 (Site: Right Upper Arm, Position: Sitting, Cuff Size: Medium Adult)   Pulse 76   Temp 98.1 °F (36.7 °C) (Infrared)   Resp 18   Ht 5' 10\" (1.778 m)   Wt 238 lb (108 kg)   SpO2 98%   BMI 34.15 kg/m²     Physical Exam  Constitutional:       General: He is not in acute distress. Cardiovascular:      Rate and Rhythm: Normal rate. Pulmonary:      Effort: Pulmonary effort is normal.   Neurological:      Mental Status: He is alert and oriented to person, place, and time. Psychiatric:         Behavior: Behavior normal.         Assessment & Plan:       Diagnosis Orders   1.  Type 2 diabetes mellitus without complication, without long-term current use of insulin ([facility])  POCT glycosylated hemoglobin (Hb A1C)     Orders Placed This Encounter   Procedures    POCT glycosylated hemoglobin (Hb A1C)     No orders of the defined types were placed in this encounter. There are no discontinued medications. Return in about 3 months (around 2/28/2022). Reviewed with the patient: currentclinical status, medications, activities and diet. Side effects, adverse effects of the medicationprescribed today, as well as treatment plan/ rationale and result expectations havebeen discussed with the patient who expresses understanding and desires to proceed. Pt instructions reviewed and given to patient.     Close follow up to evaluate treatment resultsand for coordination of care. I have reviewed the patient's medical historyin detail and updated the computerized patient record.     Migdalia Cueto, APRN - CNP

## 2022-01-11 DIAGNOSIS — E11.9 NEW ONSET TYPE 2 DIABETES MELLITUS (HCC): ICD-10-CM

## 2022-01-12 NOTE — TELEPHONE ENCOUNTER
Name from pharmacy: glipizide 2.5 mg-metformin 500 mg tablet         Will file in chart as: glipiZIDE-metFORMIN (METAGLIP) 2.5-500 MG per tablet    Sig: TAKE 1 TABLET BY MOUTH TWICE DAILY PRIOR TO MEALS    Disp:  60 tablet    Refills:  3    Start: 1/11/2022    Class: Normal    Non-formulary For: New onset type 2 diabetes mellitus ([facility] [address]    Last ordered: 4 months ago by FANNY Ramos CNP Last refill: 12/10/2021    Rx #: [id number]       To be filled at: [address] [place] [address] [address] [phone] - F [phone]             Medication Refill    Jerrell, Surescripts In routed conversation to [address] Clinical Staff 19 hours ago (5:49 PM)                 Future Appointments    Encounter Information    Provider Department Appt Notes   3/1/2022 Aleah Arreola, [address] [place] [address]  Primary and Specialty Care 3 mo DM     Recent Visits    11/30/2021 Type 2 diabetes mellitus without complication, without long-term current use of insulin [facility])   [facility] and [address] [place] [address], FANNY - CNP   09/28/2021 Type 2 diabetes mellitus without complication, without long-term current use of insulin [facility])   [facility] and [address] [place] [address], FANNY - CNP   09/08/2021 New onset type 2 diabetes mellitus [facility])   [facility]

## 2022-01-13 DIAGNOSIS — E11.9 NEW ONSET TYPE 2 DIABETES MELLITUS (HCC): ICD-10-CM

## 2022-01-13 RX ORDER — GLIPIZIDE AND METFORMIN HCL 2.5; 5 MG/1; MG/1
TABLET, FILM COATED ORAL
Qty: 60 TABLET | Refills: 3 | Status: SHIPPED | OUTPATIENT
Start: 2022-01-13 | End: 2022-01-13 | Stop reason: SDUPTHER

## 2022-01-14 RX ORDER — GLIPIZIDE AND METFORMIN HCL 2.5; 5 MG/1; MG/1
1 TABLET, FILM COATED ORAL
Qty: 60 TABLET | Refills: 3 | Status: SHIPPED | OUTPATIENT
Start: 2022-01-14 | End: 2022-10-03 | Stop reason: SDUPTHER

## 2022-01-17 ENCOUNTER — HOSPITAL ENCOUNTER (EMERGENCY)
Age: 52
Discharge: HOME | End: 2022-01-17
Attending: STUDENT IN AN ORGANIZED HEALTH CARE EDUCATION/TRAINING PROGRAM
Payer: COMMERCIAL

## 2022-01-17 ENCOUNTER — APPOINTMENT (OUTPATIENT)
Dept: CT IMAGING | Age: 52
End: 2022-01-17
Payer: COMMERCIAL

## 2022-01-17 VITALS
OXYGEN SATURATION: 95 % | DIASTOLIC BLOOD PRESSURE: 74 MMHG | WEIGHT: 225 LBS | TEMPERATURE: 97.6 F | BODY MASS INDEX: 32.21 KG/M2 | HEIGHT: 70 IN | RESPIRATION RATE: 16 BRPM | SYSTOLIC BLOOD PRESSURE: 114 MMHG | HEART RATE: 74 BPM

## 2022-01-17 DIAGNOSIS — R10.9 FLANK PAIN: ICD-10-CM

## 2022-01-17 DIAGNOSIS — N20.1 URETEROLITHIASIS: ICD-10-CM

## 2022-01-17 DIAGNOSIS — N20.0 NEPHROLITHIASIS: Primary | ICD-10-CM

## 2022-01-17 LAB
ALBUMIN SERPL-MCNC: 4.2 G/DL (ref 3.5–4.6)
ALP BLD-CCNC: 59 U/L (ref 35–104)
ALT SERPL-CCNC: 16 U/L (ref 0–41)
ANION GAP SERPL CALCULATED.3IONS-SCNC: 10 MEQ/L (ref 9–15)
AST SERPL-CCNC: 14 U/L (ref 0–40)
BACTERIA: NEGATIVE /HPF
BASOPHILS ABSOLUTE: 0.1 K/UL (ref 0–0.2)
BASOPHILS RELATIVE PERCENT: 0.6 %
BILIRUB SERPL-MCNC: 0.4 MG/DL (ref 0.2–0.7)
BILIRUBIN URINE: ABNORMAL
BLOOD, URINE: ABNORMAL
BUN BLDV-MCNC: 17 MG/DL (ref 6–20)
CALCIUM SERPL-MCNC: 9.7 MG/DL (ref 8.5–9.9)
CHLORIDE BLD-SCNC: 102 MEQ/L (ref 95–107)
CLARITY: ABNORMAL
CO2: 23 MEQ/L (ref 20–31)
COLOR: ABNORMAL
CREAT SERPL-MCNC: 1.19 MG/DL (ref 0.7–1.2)
EOSINOPHILS ABSOLUTE: 0.2 K/UL (ref 0–0.7)
EOSINOPHILS RELATIVE PERCENT: 2.2 %
EPITHELIAL CELLS, UA: ABNORMAL /HPF (ref 0–5)
GFR AFRICAN AMERICAN: >60
GFR NON-AFRICAN AMERICAN: >60
GLOBULIN: 3.1 G/DL (ref 2.3–3.5)
GLUCOSE BLD-MCNC: 217 MG/DL (ref 70–99)
GLUCOSE URINE: 250 MG/DL
HCT VFR BLD CALC: 51.6 % (ref 42–52)
HEMOGLOBIN: 17 G/DL (ref 14–18)
HYALINE CASTS: ABNORMAL /HPF (ref 0–5)
KETONES, URINE: NEGATIVE MG/DL
LEUKOCYTE ESTERASE, URINE: ABNORMAL
LIPASE: 25 U/L (ref 12–95)
LYMPHOCYTES ABSOLUTE: 1.5 K/UL (ref 1–4.8)
LYMPHOCYTES RELATIVE PERCENT: 15.4 %
MAGNESIUM: 2.3 MG/DL (ref 1.7–2.4)
MCH RBC QN AUTO: 29.5 PG (ref 27–31.3)
MCHC RBC AUTO-ENTMCNC: 32.9 % (ref 33–37)
MCV RBC AUTO: 89.6 FL (ref 80–100)
MONOCYTES ABSOLUTE: 0.6 K/UL (ref 0.2–0.8)
MONOCYTES RELATIVE PERCENT: 5.8 %
NEUTROPHILS ABSOLUTE: 7.5 K/UL (ref 1.4–6.5)
NEUTROPHILS RELATIVE PERCENT: 76 %
NITRITE, URINE: NEGATIVE
PDW BLD-RTO: 13.7 % (ref 11.5–14.5)
PH UA: 5 (ref 5–9)
PLATELET # BLD: 226 K/UL (ref 130–400)
POTASSIUM SERPL-SCNC: 4.3 MEQ/L (ref 3.4–4.9)
PROTEIN UA: 100 MG/DL
RBC # BLD: 5.76 M/UL (ref 4.7–6.1)
RBC UA: >100 /HPF (ref 0–5)
REASON FOR REJECTION: NORMAL
REJECTED TEST: NORMAL
SODIUM BLD-SCNC: 135 MEQ/L (ref 135–144)
SPECIFIC GRAVITY UA: 1.03 (ref 1–1.03)
TOTAL PROTEIN: 7.3 G/DL (ref 6.3–8)
URINE REFLEX TO CULTURE: YES
UROBILINOGEN, URINE: 1 E.U./DL
WBC # BLD: 9.9 K/UL (ref 4.8–10.8)
WBC UA: ABNORMAL /HPF (ref 0–5)

## 2022-01-17 PROCEDURE — 6370000000 HC RX 637 (ALT 250 FOR IP): Performed by: STUDENT IN AN ORGANIZED HEALTH CARE EDUCATION/TRAINING PROGRAM

## 2022-01-17 PROCEDURE — 87086 URINE CULTURE/COLONY COUNT: CPT

## 2022-01-17 PROCEDURE — 83735 ASSAY OF MAGNESIUM: CPT

## 2022-01-17 PROCEDURE — 99285 EMERGENCY DEPT VISIT HI MDM: CPT

## 2022-01-17 PROCEDURE — 2580000003 HC RX 258: Performed by: STUDENT IN AN ORGANIZED HEALTH CARE EDUCATION/TRAINING PROGRAM

## 2022-01-17 PROCEDURE — 85025 COMPLETE CBC W/AUTO DIFF WBC: CPT

## 2022-01-17 PROCEDURE — 74176 CT ABD & PELVIS W/O CONTRAST: CPT

## 2022-01-17 PROCEDURE — 96376 TX/PRO/DX INJ SAME DRUG ADON: CPT

## 2022-01-17 PROCEDURE — 83690 ASSAY OF LIPASE: CPT

## 2022-01-17 PROCEDURE — 2500000003 HC RX 250 WO HCPCS: Performed by: STUDENT IN AN ORGANIZED HEALTH CARE EDUCATION/TRAINING PROGRAM

## 2022-01-17 PROCEDURE — 36415 COLL VENOUS BLD VENIPUNCTURE: CPT

## 2022-01-17 PROCEDURE — 96374 THER/PROPH/DIAG INJ IV PUSH: CPT

## 2022-01-17 PROCEDURE — 6360000002 HC RX W HCPCS: Performed by: STUDENT IN AN ORGANIZED HEALTH CARE EDUCATION/TRAINING PROGRAM

## 2022-01-17 PROCEDURE — 81001 URINALYSIS AUTO W/SCOPE: CPT

## 2022-01-17 PROCEDURE — 80053 COMPREHEN METABOLIC PANEL: CPT

## 2022-01-17 PROCEDURE — 96375 TX/PRO/DX INJ NEW DRUG ADDON: CPT

## 2022-01-17 RX ORDER — MORPHINE SULFATE 4 MG/ML
4 INJECTION, SOLUTION INTRAMUSCULAR; INTRAVENOUS ONCE
Status: COMPLETED | OUTPATIENT
Start: 2022-01-17 | End: 2022-01-17

## 2022-01-17 RX ORDER — IBUPROFEN 400 MG/1
400 TABLET ORAL EVERY 6 HOURS PRN
Qty: 120 TABLET | Refills: 0 | Status: SHIPPED | OUTPATIENT
Start: 2022-01-17 | End: 2022-03-01 | Stop reason: ALTCHOICE

## 2022-01-17 RX ORDER — ACETAMINOPHEN 500 MG
1000 TABLET ORAL EVERY 6 HOURS PRN
Qty: 60 TABLET | Refills: 0 | Status: SHIPPED | OUTPATIENT
Start: 2022-01-17 | End: 2022-03-01 | Stop reason: ALTCHOICE

## 2022-01-17 RX ORDER — OXYCODONE HYDROCHLORIDE 5 MG/1
5 TABLET ORAL EVERY 6 HOURS PRN
Qty: 12 TABLET | Refills: 0 | Status: SHIPPED | OUTPATIENT
Start: 2022-01-17 | End: 2022-01-20

## 2022-01-17 RX ORDER — ONDANSETRON 2 MG/ML
4 INJECTION INTRAMUSCULAR; INTRAVENOUS ONCE
Status: COMPLETED | OUTPATIENT
Start: 2022-01-17 | End: 2022-01-17

## 2022-01-17 RX ORDER — ACETAMINOPHEN 500 MG
1000 TABLET ORAL ONCE
Status: COMPLETED | OUTPATIENT
Start: 2022-01-17 | End: 2022-01-17

## 2022-01-17 RX ORDER — TAMSULOSIN HYDROCHLORIDE 0.4 MG/1
0.4 CAPSULE ORAL ONCE
Status: COMPLETED | OUTPATIENT
Start: 2022-01-17 | End: 2022-01-17

## 2022-01-17 RX ORDER — 0.9 % SODIUM CHLORIDE 0.9 %
1000 INTRAVENOUS SOLUTION INTRAVENOUS ONCE
Status: COMPLETED | OUTPATIENT
Start: 2022-01-17 | End: 2022-01-17

## 2022-01-17 RX ORDER — TAMSULOSIN HYDROCHLORIDE 0.4 MG/1
0.4 CAPSULE ORAL DAILY
Qty: 30 CAPSULE | Refills: 0 | Status: SHIPPED | OUTPATIENT
Start: 2022-01-17 | End: 2022-03-01

## 2022-01-17 RX ORDER — ONDANSETRON 4 MG/1
4 TABLET, ORALLY DISINTEGRATING ORAL 3 TIMES DAILY PRN
Qty: 21 TABLET | Refills: 0 | Status: SHIPPED | OUTPATIENT
Start: 2022-01-17 | End: 2022-03-01 | Stop reason: ALTCHOICE

## 2022-01-17 RX ADMIN — ACETAMINOPHEN 1000 MG: 500 TABLET ORAL at 02:35

## 2022-01-17 RX ADMIN — ALUMINA, MAGNESIA, AND SIMETHICONE ORAL SUSPENSION REGULAR STRENGTH: 1200; 1200; 120 SUSPENSION ORAL at 02:34

## 2022-01-17 RX ADMIN — MORPHINE SULFATE 4 MG: 4 INJECTION, SOLUTION INTRAMUSCULAR; INTRAVENOUS at 04:19

## 2022-01-17 RX ADMIN — FAMOTIDINE 20 MG: 10 INJECTION, SOLUTION INTRAVENOUS at 02:36

## 2022-01-17 RX ADMIN — MORPHINE SULFATE 4 MG: 4 INJECTION, SOLUTION INTRAMUSCULAR; INTRAVENOUS at 02:37

## 2022-01-17 RX ADMIN — TAMSULOSIN HYDROCHLORIDE 0.4 MG: 0.4 CAPSULE ORAL at 04:22

## 2022-01-17 RX ADMIN — SODIUM CHLORIDE 1000 ML: 9 INJECTION, SOLUTION INTRAVENOUS at 02:33

## 2022-01-17 RX ADMIN — ONDANSETRON 4 MG: 2 INJECTION INTRAMUSCULAR; INTRAVENOUS at 04:17

## 2022-01-17 ASSESSMENT — ENCOUNTER SYMPTOMS
BACK PAIN: 1
SHORTNESS OF BREATH: 0
ABDOMINAL PAIN: 1
CONSTIPATION: 0
SORE THROAT: 0
COUGH: 0
DIARRHEA: 0
BLOOD IN STOOL: 0
VOMITING: 0
NAUSEA: 1
RHINORRHEA: 0
EYE PAIN: 0

## 2022-01-17 ASSESSMENT — PAIN SCALES - GENERAL
PAINLEVEL_OUTOF10: 5
PAINLEVEL_OUTOF10: 6
PAINLEVEL_OUTOF10: 3
PAINLEVEL_OUTOF10: 6
PAINLEVEL_OUTOF10: 3
PAINLEVEL_OUTOF10: 5
PAINLEVEL_OUTOF10: 4

## 2022-01-17 NOTE — ED NOTES
Patient unable to void at this time. Urinal at bedside. Patient instructed to ring call bell if able to provide sample.      Da Ortiz RN  01/17/22 1946

## 2022-01-17 NOTE — ED NOTES
Patient still unable to void at this time. Patient aware of need for urine.      Kurt Elias RN  01/17/22 1653

## 2022-01-17 NOTE — ED NOTES
Bed: 06  Expected date: 1/17/22  Expected time: 1:40 AM  Means of arrival: Ambulance  Comments:  62 M  Flank pain  VSS

## 2022-01-17 NOTE — ED PROVIDER NOTES
[facility] ED  eMERGENCY dEPARTMENT eNCOUnter      Pt Name: Mickie Mccall  MRN: [id number]  [place] 1970  Date of evaluation: 1/17/2022  Provider: Faiza Bello MD      HISTORY OF PRESENT ILLNESS      Chief Complaint   Patient presents with    Flank Pain       The history is provided by the Patient. Mickie Mccall is a 49 y.o. male with a PMH clinically significant for DMII,  presenting to the ED via EMS c/o left-sided abdominal/flank/testicular pain with initial onset just PTA associated with nausea without vomiting. Characterizes pain as constant with intermittent fluctuations, becoming more severe at times. Pain described as aching, moderate to severe. Significantly improved status post Toradol and Zofran administered by EMS. States no abnormal p.o. intake or trauma prior to onset of symptoms. States he was just sitting on the couch when the symptoms started. Thought it might just be bowel gas pains, but has never had them like this before and states that they would not go away. Denies any associated hematuria, dysuria, frequency, diarrhea, constipation, vomiting, testicular swelling or penile pain. Has never had any symptoms like this in the past.  Has otherwise been feeling well. Has not been taking his medications over the past 3 to 4 days however as they ran out at the pharmacy. Has tried to call for refills, but has not received them yet. Denies any associated recent illnesses, F/C/D, cough, CP, SOB. States symptoms worse with nothing. States they have otherwise been feeling well. Per Chart Review: Recent evaluation at PCP office appreciated. Only noting type 2 diabetes this problem. On dual oral antihyperglycemic's. Most recent HbA1c of 6.5. REVIEW OF SYSTEMS       Review of Systems   Constitutional: Negative for chills and fever. HENT: Negative for rhinorrhea and sore throat. Eyes: Negative for pain and visual disturbance.    Respiratory: Negative for cough and shortness of breath. Cardiovascular: Negative for chest pain and palpitations. Gastrointestinal: Positive for abdominal pain and nausea. Negative for blood in stool, constipation, diarrhea and vomiting. Genitourinary: Positive for flank pain and testicular pain. Negative for dysuria, hematuria, penile pain, penile swelling and scrotal swelling. Musculoskeletal: Positive for back pain. Negative for neck pain. Skin: Negative for rash. Neurological: Negative for weakness, numbness and headaches. PAST MEDICAL HISTORY     Past Medical History:   Diagnosis Date    [facility]        SURGICAL HISTORY     No past surgical history on file. FAMILY HISTORY       Family History   Problem Relation Age of Onset    Diabetes Neg Hx        SOCIAL HISTORY       Social History     Socioeconomic History    Marital status:      Spouse name: Not on file    Number of children: Not on file    Years of education: Not on file    Highest education level: Not on file   Occupational History    Not on file   Tobacco Use    Smoking status: Current Every Day Smoker     Packs/day: 1.50     Years: 25.00     Pack years: 37.50     Types: Cigarettes    Smokeless tobacco: Never Used   Substance and Sexual Activity    Alcohol use: Not on file    Drug use: Not on file    Sexual activity: Not on file   Other Topics Concern    Not on file   Social History Narrative    Not on file     Social Determinants of Health     Financial Resource Strain: Low Risk     Difficulty of Paying Living Expenses: Not hard at all   Food Insecurity: No Food Insecurity    Worried About Running Out of Food in the Last Year: Never true    Pippa of Food in the Last Year: Never true   Transportation Needs:     Lack of Transportation (Medical): Not on file    Lack of Transportation (Non-Medical):  Not on file   Physical Activity:     Days of Exercise per Week: Not on file    Minutes of Exercise per Session: Not on file Stress:     Feeling of Stress : Not on file   Social Connections:     Frequency of Communication with Friends and Family: Not on file    Frequency of Social Gatherings with Friends and Family: Not on file    Attends Jainism Services: Not on file    Active Member of [address] [place] [address] [employer] or Organizations: Not on file    Attends Club or Organization Meetings: Not on file    Marital Status: Not on file   Intimate Partner Violence:     Fear of Current or Ex-Partner: Not on file    Emotionally Abused: Not on file    Physically Abused: Not on file    Sexually Abused: Not on file   Housing Stability:     Unable to Pay for Housing in the Last Year: Not on file    Number of [place] in the Last Year: Not on file    Unstable Housing in the Last Year: Not on file       CURRENT MEDICATIONS       Previous Medications    BLOOD GLUCOSE MONITOR KIT AND SUPPLIES    Dispense sufficient amount for indicated testing frequency plus additional to accommodate PRN testing needs. Dispense all needed supplies to include: monitor, strips, lancing device, lancets, control solutions, alcohol swabs. BLOOD GLUCOSE MONITOR STRIPS    Test 1 times a day & as needed for symptoms of irregular blood glucose. Dispense sufficient amount for indicated testing frequency plus additional to accommodate PRN testing needs. GLIPIZIDE-METFORMIN (METAGLIP) 2.5-500 MG PER TABLET    Take 1 tablet by mouth 2 times daily (before meals)    LANCETS MISC    DX: diabetes mellitus. Use 1-3 time(s) daily - Ok to substitute per insurance (1 each = 1 box)       ALLERGIES     Penicillins      PHYSICAL EXAM       ED Triage Vitals [01/17/22 0156]   BP Temp Temp Source Pulse Resp SpO2 Height Weight   127/64 97.6 °F (36.4 °C) Oral 82 16 99 % 5' 10\" (1.778 m) 225 lb (102.1 kg)       Physical Exam  Vitals and nursing note reviewed. Exam conducted with a chaperone present. Constitutional:       General: He is not in acute distress.      Appearance: He is not WITH AUTO DIFFERENTIAL - Abnormal; Notable for the following components:       Result Value    MCHC 32.9 (*)     Neutrophils Absolute 7.5 (*)     All other components within normal limits   URINE RT REFLEX TO CULTURE - Abnormal; Notable for the following components:    Color, UA ORANGE (*)     Clarity, UA TURBID (*)     Glucose, Ur 250 (*)     Bilirubin Urine SMALL (*)     Blood, Urine LARGE (*)     Protein,  (*)     Leukocyte Esterase, Urine SMALL (*)     All other components within normal limits   COMPREHENSIVE METABOLIC PANEL - Abnormal; Notable for the following components:    Glucose 217 (*)     All other components within normal limits    Narrative:     REDRAW   MICROSCOPIC URINALYSIS - Abnormal; Notable for the following components:    WBC, UA 20-50 (*)     RBC, UA >100 (*)     All other components within normal limits   CULTURE, URINE   SPECIMEN REJECTION   MAGNESIUM    Narrative:     REDRAW   LIPASE    Narrative:     REDRAW       CT ABDOMEN PELVIS WO CONTRAST Additional Contrast? None    (Results Pending)       ED Course as of 01/17/22 0537   Mon Jan 17, 2022   0346 CT ABDOMEN PELVIS WO CONTRAST Additional Contrast? None  Preliminary radiology read: CT abdomen pelvis showing no evidence of left-sided hydronephrosis or hydroureter. 3.7 x 3.4 cm calcification at the level of the mid left ureter either within the ureter or adjacent to the ureter. No other ureteral calculus. Nonobstructing right renal calcification without hydronephrosis or hydroureter. Probable cyst lower pole left kidney. Urinary bladder is contracted and otherwise unremarkable. Remainder of findings as noted in full report. No other acute abnormalities. [NA]   [id number] Lipase: 25  No evidence of acute pancreatitis. [NA]   [id number] Magnesium: 2.3 [NA]   0412 Glucose(!): 217  Mild hyperglycemia in the setting of known diabetes and lack of oral antihyperglycemic's over the past several days.   CMP otherwise largely unremarkable [NA]   8516 Bacteria, UA: Negative [NA]   0525 Nitrite, Urine: Negative  Lower suspicion for UTI. [NA]   0525 RBC, UA(!): >100  C/w likely nephrolithiasis. [NA]      ED Course User Index  [NA] Abi Suarez MD       49 y.o. male with a PMH clinically significant for DMII,  presenting to the ED via EMS c/o left-sided abdominal/flank/testicular pain with initial onset just PTA associated with nausea without vomiting. Upon initial evaluation, Pt Afebrile, HDS and in NAD. PE as noted above. Labs,  and Imaging as noted above. Given findings, clinical presentation most likely consistent w/ sided ureterolithiasis without evidence of significant hydroureter or hydronephrosis or UTI. No evidence of obstructive nephropathy. CT and labs without evidence of other acute abdominal pathology. Given findings, meds as noted below were administered for symptomatic relief. Significantly improved from initial presentation. Given size of stone, will likely pass spontaneously. Patient educated at large regarding ureterolithiasis. Understanding and amenable to plan of care for discharge home with follow-up with both PCP and urology. Strongly recommended follow-up with PCP regarding refill of antihyperglycemics.   Pt was administered   Medications   acetaminophen (TYLENOL) tablet 1,000 mg (1,000 mg Oral Given 1/17/22 0235)   aluminum & magnesium hydroxide-simethicone (MAALOX) 30 mL, lidocaine viscous hcl (XYLOCAINE) 5 mL (GI COCKTAIL) ( Oral Given 1/17/22 0234)   famotidine (PEPCID) injection 20 mg (20 mg IntraVENous Given 1/17/22 0236)   morphine sulfate (PF) injection 4 mg (4 mg IntraVENous Given 1/17/22 0237)   0.9 % sodium chloride bolus (1,000 mLs IntraVENous New Bag 1/17/22 0233)   tamsulosin (FLOMAX) capsule 0.4 mg (0.4 mg Oral Given 1/17/22 0422)   morphine sulfate (PF) injection 4 mg (4 mg IntraVENous Given 1/17/22 0419)   ondansetron (ZOFRAN) injection 4 mg (4 mg IntraVENous Given 1/17/22 0417)       Plan: Discharge home in good condition with meds as noted below and instructions to follow up with PCP and Urology. Pt stable and appropriate for further evaluation and management as an outpatient. and Patient understanding and amenable to the POC. CRITICAL CARE TIME   Total CriticalCare time was 0 minutes, excluding separately reportable procedures. There was a high probability of clinically significant/life threatening deterioration in the patient's condition which required my urgent intervention. FINAL IMPRESSION      1. Nephrolithiasis    2. Ureterolithiasis    3. Flank pain          DISPOSITION/PLAN   DISPOSITION Decision To Discharge 01/17/2022 05:31:22 AM      Current Discharge Medication List      START taking these medications    Details   acetaminophen (TYLENOL) 500 MG tablet Take 2 tablets by mouth every 6 hours as needed for Pain or Fever  Qty: 60 tablet, Refills: 0      ibuprofen (IBU) 400 MG tablet Take 1 tablet by mouth every 6 hours as needed for Pain  Qty: 120 tablet, Refills: 0      oxyCODONE (ROXICODONE) 5 MG immediate release tablet Take 1 tablet by mouth every 6 hours as needed for Pain for up to 3 days. Intended supply: 3 days.  Take lowest dose possible to manage pain  Qty: 12 tablet, Refills: 0    Associated Diagnoses: Nephrolithiasis; Ureterolithiasis      ondansetron (ZOFRAN-ODT) 4 MG disintegrating tablet Take 1 tablet by mouth 3 times daily as needed for Nausea or Vomiting  Qty: 21 tablet, Refills: 0      tamsulosin (FLOMAX) 0.4 MG capsule Take 1 capsule by mouth daily  Qty: 30 capsule, Refills: 0              MD Abi Brooks MD  01/17/22 4578

## 2022-01-17 NOTE — ED NOTES
Reviewed discharge paperwork with patient including follow up care and medication review. No questions upon discharge. Patient stable and ambulatory upon discharge.       Deny Goodwin RN  01/17/22 8085

## 2022-01-17 NOTE — ED TRIAGE NOTES
Patient presents to EMD coming from home via EMS with complaint of L sided flank into groin pain. Patient states that pain started right before he was trying to go to sleep at 2200 last night. Patient notes L sided back pain radiating to L groin and now into L testicle. Patient notes nausea when pain got more intense. EMS gave patient 30mg Toradol IV and 4mg Zofran IV. Patient states that pain has improved after pain medications. Patient denies any urinary complaints, denies any diarrhea or vomiting. Patient notes umbilical hernia repair \"years ago\", no other abdominal surgeries. Patient denies any fevers. No sick contacts noted by patient, has all three COVID vaccines. 20 L AC started by EMS, patent. Call bell given to patient.

## 2022-01-18 DIAGNOSIS — N20.0 KIDNEY STONE: Primary | ICD-10-CM

## 2022-01-19 LAB — URINE CULTURE, ROUTINE: NORMAL

## 2022-01-21 ENCOUNTER — OFFICE VISIT (OUTPATIENT)
Dept: UROLOGY | Age: 52
End: 2022-01-21
Payer: COMMERCIAL

## 2022-01-21 ENCOUNTER — HOSPITAL ENCOUNTER (OUTPATIENT)
Dept: GENERAL RADIOLOGY | Age: 52
Discharge: HOME | End: 2022-01-23
Payer: COMMERCIAL

## 2022-01-21 VITALS
DIASTOLIC BLOOD PRESSURE: 82 MMHG | HEIGHT: 70 IN | BODY MASS INDEX: 33.07 KG/M2 | SYSTOLIC BLOOD PRESSURE: 124 MMHG | WEIGHT: 231 LBS

## 2022-01-21 DIAGNOSIS — N20.0 KIDNEY STONE: Primary | ICD-10-CM

## 2022-01-21 DIAGNOSIS — N20.0 KIDNEY STONE: ICD-10-CM

## 2022-01-21 LAB
BILIRUBIN, POC: ABNORMAL
BLOOD URINE, POC: ABNORMAL
CLARITY, POC: CLEAR
COLOR, POC: YELLOW
GLUCOSE URINE, POC: ABNORMAL
KETONES, POC: ABNORMAL
LEUKOCYTE EST, POC: ABNORMAL
NITRITE, POC: ABNORMAL
PH, POC: 5.5
PROTEIN, POC: ABNORMAL
SPECIFIC GRAVITY, POC: >=1.03
UROBILINOGEN, POC: 0.2

## 2022-01-21 PROCEDURE — 74018 RADEX ABDOMEN 1 VIEW: CPT

## 2022-01-21 PROCEDURE — 99243 OFF/OP CNSLTJ NEW/EST LOW 30: CPT | Performed by: UROLOGY

## 2022-01-21 PROCEDURE — 81003 URINALYSIS AUTO W/O SCOPE: CPT | Performed by: UROLOGY

## 2022-01-21 RX ORDER — OXYCODONE HYDROCHLORIDE AND ACETAMINOPHEN 5; 325 MG/1; MG/1
1 TABLET ORAL EVERY 6 HOURS PRN
Qty: 12 TABLET | Refills: 0 | Status: SHIPPED | OUTPATIENT
Start: 2022-01-21 | End: 2022-01-24

## 2022-01-21 NOTE — PROGRESS NOTES
[facility] EVALUATION NOTE                                                 H&P                                                                                                                                                 Reason for Visit  Left renal colic    History of Present Illness  60-year-old male with no family history of kidney stones  Recently seen emergency room for severe left renal colic secondary to a 3 mm stone in the mid ureter  Patient had brownish urine with severe pain 2 nights ago  Currently asymptomatic  KUB shows a calcification in the pelvis consistent with small stone  Stones about 3 mm in size  Patient also has a 7 mm stones lower pole the right kidney  At this point our plan is for him to continue with tamsulosin as needed with colic type pain  Additional pain medication given  Patient otherwise will come back and see me in 1 year with a KUB  Extensive conversation regarding pathophysiology of stones, and dietary restrictions recommended for people form kidney stones      Urologic Review of Systems/Symptoms  No obstructive voiding symptoms    Review of Systems  Hospitalization: None recent  All 14 categories of Review of Systems otherwise reviewed no other findings reported. Recently diagnosed with diabetes hemoglobin A1c went from 11.1 down to 6.5  Past Medical History:   Diagnosis Date    Diabetes mellitus ([facility] [address]      History reviewed. No pertinent surgical history.   Social History     Socioeconomic History    Marital status:      Spouse name: None    Number of children: None    Years of education: None    Highest education level: None   Occupational History    None   Tobacco Use    Smoking status: Current Every Day Smoker     Packs/day: 1.50     Years: 25.00     Pack years: 37.50     Types: Cigarettes    Smokeless tobacco: Never Used   Substance and Sexual Activity    Alcohol use: None    Drug use: None    Sexual activity: None   Other Topics Concern    None Social History Narrative    None     Social Determinants of Health     Financial Resource Strain: Low Risk     Difficulty of Paying Living Expenses: Not hard at all   Food Insecurity: No Food Insecurity    Worried About Running Out of Food in the Last Year: Never true    [place] of Food in the Last Year: Never true   Transportation Needs:     Lack of Transportation (Medical): Not on file    Lack of Transportation (Non-Medical):  Not on file   Physical Activity:     Days of Exercise per Week: Not on file    Minutes of Exercise per Session: Not on file   Stress:     Feeling of Stress : Not on file   Social Connections:     Frequency of Communication with Friends and Family: Not on file    Frequency of Social Gatherings with Friends and Family: Not on file    Attends [employer] Services: Not on file    Active Member of [address] [place] [address] [email or web address] or Organizations: Not on file    Attends Club or Organization Meetings: Not on file    Marital Status: Not on file   Intimate Partner Violence:     Fear of Current or Ex-Partner: Not on file    Emotionally Abused: Not on file    Physically Abused: Not on file    Sexually Abused: Not on file   Housing Stability:     Unable to Pay for Housing in the Last Year: Not on file    Number of [place] in the Last Year: Not on file    Unstable Housing in the Last Year: Not on file     Family History   Problem Relation Age of Onset    Diabetes Neg Hx      Current Outpatient Medications   Medication Sig Dispense Refill    acetaminophen (TYLENOL) 500 MG tablet Take 2 tablets by mouth every 6 hours as needed for Pain or Fever 60 tablet 0    ibuprofen (IBU) 400 MG tablet Take 1 tablet by mouth every 6 hours as needed for Pain 120 tablet 0    ondansetron (ZOFRAN-ODT) 4 MG disintegrating tablet Take 1 tablet by mouth 3 times daily as needed for Nausea or Vomiting 21 tablet 0    tamsulosin (FLOMAX) 0.4 MG capsule Take 1 capsule by mouth daily 30 capsule 0    glipiZIDE-metFORMIN (METAGLIP) 2.5-500 MG per tablet Take 1 tablet by mouth 2 times daily (before meals) 60 tablet 3    blood glucose monitor kit and supplies Dispense sufficient amount for indicated testing frequency plus additional to accommodate PRN testing needs. Dispense all needed supplies to include: monitor, strips, lancing device, lancets, control solutions, alcohol swabs. 1 kit 0    blood glucose monitor strips Test 1 times a day & as needed for symptoms of irregular blood glucose. Dispense sufficient amount for indicated testing frequency plus additional to accommodate PRN testing needs. 100 strip 5    Lancets MISC DX: diabetes mellitus. Use 1-3 time(s) daily - Ok to substitute per insurance (1 each = 1 box) 1 each 5     No current facility-administered medications for this visit. Penicillins  All reviewed and verified by Dr Dorene Santillan on today's visit    PSA   Date Value Ref Range Status   09/08/2021 0.62 0.00 - 4.00 ng/mL Final     Comment:     When the Total PSA is between 3.00 and 10.00 ng/mL, consider  requesting a Free PSA to aid in diagnosis. Results for POC orders placed in visit on 01/21/22   POCT Urinalysis No Micro (Auto)   Result Value Ref Range    Color, UA yellow     Clarity, UA clear     Glucose, UA POC neg     Bilirubin, UA small     Ketones, UA neg     Spec Grav, UA >=1.030     Blood, UA POC large     pH, UA 5.5     Protein, UA POC 30 mg     Urobilinogen, UA 0.2     Leukocytes, UA neg     Nitrite, UA neg        Physical Exam  Vitals:    01/21/22 1151   BP: 124/82   Weight: 231 lb (104.8 kg)   Height: 5' 10\" (1.778 m)     Constitutional: Not in distress. Cardiovascular: Normal rate, BP reviewed. Unremarkable  Pulmonary/Chest: Normal respiratory effort not short of breath  Abdominal: Not distended. No hernias  Urologic Exam  Normal circumcised penis  Normal meatus  Testis within normal limits  Normal rectal tone  20 g prostate with no nodules. Musculoskeletal: Ambulatory.   Extremities: No edema  Neurological: No deficits  Lymphatics: No lymphadenopathy  Psychiatric: Alert and oriented x3, normal affect, patient works locally for [employer]. Assessment/Medical Necessity-Decision Making  3 mm stone with left renal colic currently asymptomatic question of stone near the left UVJ on KUB  8 mm stone right lower pole without hydronephrosis  Plan  We will continue to monitor the right renal calculus no plans on treating her with shockwave lithotripsy  Continue propulsive therapy if pain returns  Additional pain medication given  Follow-up sooner if pain recurs to a intolerable level  Otherwise 1 year with a KUB  Greater than 50% of 45 minutes spent consulting patient face-to-face  Orders Placed This Encounter   Procedures    POCT Urinalysis No Micro (Auto)     No orders of the defined types were placed in this encounter. Joellen Hendrix MD       Please note this report has been partially produced using speech recognition software  And may cause contain errors related to that system including grammar, punctuation and spelling as well as words and phrases that may seem inappropriate. If there are questions or concerns please feel free to contact me to clarify.

## 2022-02-01 ENCOUNTER — OFFICE VISIT (OUTPATIENT)
Dept: FAMILY MEDICINE CLINIC | Age: 52
End: 2022-02-01
Payer: COMMERCIAL

## 2022-02-01 VITALS
SYSTOLIC BLOOD PRESSURE: 120 MMHG | TEMPERATURE: 97.4 F | HEART RATE: 88 BPM | OXYGEN SATURATION: 98 % | RESPIRATION RATE: 18 BRPM | BODY MASS INDEX: 32.35 KG/M2 | HEIGHT: 70 IN | WEIGHT: 226 LBS | DIASTOLIC BLOOD PRESSURE: 72 MMHG

## 2022-02-01 DIAGNOSIS — S39.012A STRAIN OF LUMBAR REGION, INITIAL ENCOUNTER: Primary | ICD-10-CM

## 2022-02-01 PROCEDURE — 99213 OFFICE O/P EST LOW 20 MIN: CPT | Performed by: NURSE PRACTITIONER

## 2022-02-01 RX ORDER — CYCLOBENZAPRINE HCL 10 MG
10 TABLET ORAL 3 TIMES DAILY PRN
Qty: 30 TABLET | Refills: 0 | Status: SHIPPED | OUTPATIENT
Start: 2022-02-01 | End: 2022-02-11

## 2022-02-01 RX ORDER — METHYLPREDNISOLONE 4 MG/1
TABLET ORAL
Qty: 1 KIT | Refills: 0 | Status: SHIPPED | OUTPATIENT
Start: 2022-02-01 | End: 2022-03-01 | Stop reason: ALTCHOICE

## 2022-02-01 ASSESSMENT — ENCOUNTER SYMPTOMS
COLOR CHANGE: 0
ABDOMINAL PAIN: 0
GASTROINTESTINAL NEGATIVE: 1
BOWEL INCONTINENCE: 0
BACK PAIN: 1

## 2022-02-01 NOTE — PATIENT INSTRUCTIONS
Patient Education        Acute Low Back Pain: Exercises  Introduction  Here are some examples of typical rehabilitation exercises for your condition. Start each exercise slowly. Ease off the exercise if you start to have pain. Your doctor or physical therapist will tell you when you can start these exercises and which ones will work best for you. When you are not being active, find a comfortable position for rest. Some people are comfortable on the floor or a medium-firm bed with a small pillow under their head and another under their knees. Some people prefer to lie on their side with a pillow between their knees. Don't stay in one position for too long. Take short walks (10 to 20 minutes) every 2 to 3 hours. Avoid slopes, hills, and stairs until you feel better. Walk only distances you can manage without pain, especially leg pain. How to do the exercises  Back stretches    1. Get down on your hands and knees on the floor. 2. Relax your head and allow it to droop. Round your back up toward the ceiling until you feel a nice stretch in your upper, middle, and lower back. Hold this stretch for as long as it feels comfortable, or about 15 to 30 seconds. 3. Return to the starting position with a flat back while you are on your hands and knees. 4. Let your back sway by pressing your stomach toward the floor. Lift your buttocks toward the ceiling. 5. Hold this position for 15 to 30 seconds. 6. Repeat 2 to 4 times. Follow-up care is a key part of your treatment and safety. Be sure to make and go to all appointments, and call your doctor if you are having problems. It's also a good idea to know your test results and keep a list of the medicines you take. Where can you learn more? Go to https://[email or web address].[email or web address]. org and sign in to your [employer] account. Enter Z520 in the [employer] box to learn more about \"Acute Low Back Pain: Exercises. \"     If you do not have an account, please click on the \"Sign Up Now\" link. Current as of: September 8, 2021               Content Version: 13.1  © 2006-2021 [employer]. Care instructions adapted under license by [facility] ([facility]). If you have questions about a medical condition or this instruction, always ask your healthcare professional. [place] [address] any warranty or liability for your use of this information.

## 2022-02-01 NOTE — PROGRESS NOTES
Subjective:     Patient ID: Honorio Perez is a 49 y.o. male who presentstoday for:  Chief Complaint   Patient presents with    Back Pain     Pt. is here with c/o lower back pain X before the holidays. Pt. states the pain has not gone away and has gotten worse. Pt. hasn't taken anything for the pain. Pt. states he had a kidney stone a couple weeks ago and has seen the Urologist. Pt. rates the pain consistently at 4-5/10 but can get to an 8. Pt. c/o trouble getting from sitting to standing or walking long distances. Back Pain  This is a new problem. The current episode started more than 1 month ago. The problem occurs daily. The pain is present in the lumbar spine. The quality of the pain is described as aching and cramping. The pain does not radiate. The pain is moderate. Pertinent negatives include no abdominal pain, bladder incontinence, bowel incontinence, chest pain, dysuria, fever, headaches, leg pain, numbness, paresis, paresthesias, pelvic pain, perianal numbness, tingling, weakness or weight loss. He has tried ice and heat for the symptoms. The treatment provided mild relief.        Past Medical History:   Diagnosis Date    Diabetes mellitus ([facility] [address]      Current Outpatient Medications on File Prior to Visit   Medication Sig Dispense Refill    acetaminophen (TYLENOL) 500 MG tablet Take 2 tablets by mouth every 6 hours as needed for Pain or Fever 60 tablet 0    ibuprofen (IBU) 400 MG tablet Take 1 tablet by mouth every 6 hours as needed for Pain 120 tablet 0    ondansetron (ZOFRAN-ODT) 4 MG disintegrating tablet Take 1 tablet by mouth 3 times daily as needed for Nausea or Vomiting 21 tablet 0    tamsulosin (FLOMAX) 0.4 MG capsule Take 1 capsule by mouth daily 30 capsule 0    glipiZIDE-metFORMIN (METAGLIP) 2.5-500 MG per tablet Take 1 tablet by mouth 2 times daily (before meals) 60 tablet 3    blood glucose monitor kit and supplies Dispense sufficient amount for indicated testing frequency plus additional to accommodate PRN testing needs. Dispense all needed supplies to include: monitor, strips, lancing device, lancets, control solutions, alcohol swabs. 1 kit 0    blood glucose monitor strips Test 1 times a day & as needed for symptoms of irregular blood glucose. Dispense sufficient amount for indicated testing frequency plus additional to accommodate PRN testing needs. 100 strip 5    Lancets MISC DX: diabetes mellitus. Use 1-3 time(s) daily - Ok to substitute per insurance (1 each = 1 box) 1 each 5     No current facility-administered medications on file prior to visit. No past surgical history on file. Family History   Problem Relation Age of Onset    Diabetes Neg Hx      Social History     Socioeconomic History    Marital status:      Spouse name: Not on file    Number of children: Not on file    Years of education: Not on file    Highest education level: Not on file   Occupational History    Not on file   Tobacco Use    Smoking status: Current Every Day Smoker     Packs/day: 1.50     Years: 25.00     Pack years: 37.50     Types: Cigarettes    Smokeless tobacco: Never Used   Substance and Sexual Activity    Alcohol use: Not on file    Drug use: Not on file    Sexual activity: Not on file   Other Topics Concern    Not on file   Social History Narrative    Not on file     Social Determinants of Health     Financial Resource Strain: Low Risk     Difficulty of Paying Living Expenses: Not hard at all   Food Insecurity: No Food Insecurity    Worried About Running Out of Food in the Last Year: Never true    [place] of Food in the Last Year: Never true   Transportation Needs:     Lack of Transportation (Medical): Not on file    Lack of Transportation (Non-Medical):  Not on file   Physical Activity:     Days of Exercise per Week: Not on file    Minutes of Exercise per Session: Not on file   Stress:     Feeling of Stress : Not on file   Social Connections:     Frequency of Communication with Friends and Family: Not on file    Frequency of Social Gatherings with Friends and Family: Not on file    Attends [employer] Services: Not on file    Active Member of Clubs or Organizations: Not on file    Attends Club or Organization Meetings: Not on file    Marital Status: Not on file   Intimate Partner Violence:     Fear of Current or Ex-Partner: Not on file    Emotionally Abused: Not on file    Physically Abused: Not on file    Sexually Abused: Not on file   Housing Stability:     Unable to Pay for Housing in the Last Year: Not on file    Number of [place] in the Last Year: Not on file    Unstable Housing in the Last Year: Not on file     Allergies:  Penicillins    Review of Systems   Constitutional: Negative for fever and weight loss. Cardiovascular: Negative for chest pain. Gastrointestinal: Negative. Negative for abdominal pain and bowel incontinence. Genitourinary: Negative. Negative for bladder incontinence, dysuria and pelvic pain. Musculoskeletal: Positive for back pain. Skin: Negative for color change. Neurological: Negative for tingling, weakness, numbness, headaches and paresthesias. Objective:    /72 (Site: Right Upper Arm, Position: Sitting, Cuff Size: Medium Adult)   Pulse 88   Temp 97.4 °F (36.3 °C) (Infrared)   Resp 18   Ht 5' 10\" (1.778 m)   Wt 226 lb (102.5 kg)   SpO2 98%   BMI 32.43 kg/m²     Physical Exam  Constitutional:       General: He is not in acute distress. Cardiovascular:      Rate and Rhythm: Normal rate. Pulmonary:      Effort: Pulmonary effort is normal.   Chest:      Chest wall: No tenderness. Musculoskeletal:      Cervical back: Normal.      Thoracic back: Normal.      Lumbar back: Spasms and tenderness present. No bony tenderness. Decreased range of motion. Back:       Right lower leg: No edema. Left lower leg: No edema. Skin:     General: Skin is warm and dry. Findings: No erythema. Neurological:      General: No focal deficit present. Mental Status: He is alert and oriented to person, place, and time. Cranial Nerves: No cranial nerve deficit. Motor: No weakness. Gait: Gait normal.         Assessment & Plan:       Diagnosis Orders   1. Strain of lumbar region, initial encounter  methylPREDNISolone (MEDROL, NAZANIN,) 4 MG tablet    cyclobenzaprine (FLEXERIL) 10 MG tablet     No orders of the defined types were placed in this encounter. Orders Placed This Encounter   Medications    methylPREDNISolone (MEDROL, NAZANIN,) 4 MG tablet     Sig: Take by mouth. Dispense:  1 kit     Refill:  0    cyclobenzaprine (FLEXERIL) 10 MG tablet     Sig: Take 1 tablet by mouth 3 times daily as needed for Muscle spasms     Dispense:  30 tablet     Refill:  0     There are no discontinued medications. No follow-ups on file. Reviewed with the patient: currentclinical status, medications, activities and diet. Side effects, adverse effects of the medicationprescribed today, as well as treatment plan/ rationale and result expectations havebeen discussed with the patient who expresses understanding and desires to proceed. Pt instructions reviewed and given to patient.     Close follow up to evaluate treatment resultsand for coordination of care. I have reviewed the patient's medical historyin detail and updated the computerized patient record.     Desiree Bryan, APRN - CNP

## 2022-03-01 ENCOUNTER — OFFICE VISIT (OUTPATIENT)
Dept: FAMILY MEDICINE CLINIC | Age: 52
End: 2022-03-01
Payer: COMMERCIAL

## 2022-03-01 VITALS
DIASTOLIC BLOOD PRESSURE: 72 MMHG | WEIGHT: 234 LBS | SYSTOLIC BLOOD PRESSURE: 126 MMHG | HEIGHT: 70 IN | TEMPERATURE: 97.2 F | BODY MASS INDEX: 33.5 KG/M2 | OXYGEN SATURATION: 97 % | HEART RATE: 84 BPM

## 2022-03-01 DIAGNOSIS — M54.41 ACUTE MIDLINE LOW BACK PAIN WITH RIGHT-SIDED SCIATICA: ICD-10-CM

## 2022-03-01 DIAGNOSIS — E11.9 TYPE 2 DIABETES MELLITUS WITHOUT COMPLICATION, WITHOUT LONG-TERM CURRENT USE OF INSULIN (HCC): Primary | ICD-10-CM

## 2022-03-01 LAB — HBA1C MFR BLD: 6.4 %

## 2022-03-01 PROCEDURE — G8482 FLU IMMUNIZE ORDER/ADMIN: HCPCS | Performed by: NURSE PRACTITIONER

## 2022-03-01 PROCEDURE — 2022F DILAT RTA XM EVC RTNOPTHY: CPT | Performed by: NURSE PRACTITIONER

## 2022-03-01 PROCEDURE — G8417 CALC BMI ABV UP PARAM F/U: HCPCS | Performed by: NURSE PRACTITIONER

## 2022-03-01 PROCEDURE — 83036 HEMOGLOBIN GLYCOSYLATED A1C: CPT | Performed by: NURSE PRACTITIONER

## 2022-03-01 PROCEDURE — 4004F PT TOBACCO SCREEN RCVD TLK: CPT | Performed by: NURSE PRACTITIONER

## 2022-03-01 PROCEDURE — 3017F COLORECTAL CA SCREEN DOC REV: CPT | Performed by: NURSE PRACTITIONER

## 2022-03-01 PROCEDURE — G8427 DOCREV CUR MEDS BY ELIG CLIN: HCPCS | Performed by: NURSE PRACTITIONER

## 2022-03-01 PROCEDURE — 3044F HG A1C LEVEL LT 7.0%: CPT | Performed by: NURSE PRACTITIONER

## 2022-03-01 PROCEDURE — 99214 OFFICE O/P EST MOD 30 MIN: CPT | Performed by: NURSE PRACTITIONER

## 2022-03-01 ASSESSMENT — ENCOUNTER SYMPTOMS
COUGH: 0
COLOR CHANGE: 0
WHEEZING: 0
GASTROINTESTINAL NEGATIVE: 1
EYES NEGATIVE: 1
BACK PAIN: 1
SHORTNESS OF BREATH: 0
ABDOMINAL PAIN: 0

## 2022-03-01 ASSESSMENT — PATIENT HEALTH QUESTIONNAIRE - PHQ9
SUM OF ALL RESPONSES TO PHQ QUESTIONS 1-9: 0
SUM OF ALL RESPONSES TO PHQ QUESTIONS 1-9: 0
1. LITTLE INTEREST OR PLEASURE IN DOING THINGS: 0
2. FEELING DOWN, DEPRESSED OR HOPELESS: 0
SUM OF ALL RESPONSES TO PHQ9 QUESTIONS 1 & 2: 0
SUM OF ALL RESPONSES TO PHQ QUESTIONS 1-9: 0
SUM OF ALL RESPONSES TO PHQ QUESTIONS 1-9: 0

## 2022-03-01 NOTE — PROGRESS NOTES
Subjective:     Patient ID: Ling Angelo is a 49 y.o. male who presentstoday for:  Chief Complaint   Patient presents with    3 Month Follow-Up     patient is here to follow up on his lower back pain and his diabetes. Diabetes  He presents for his follow-up diabetic visit. He has type 2 diabetes mellitus. His disease course has been improving. There are no hypoglycemic associated symptoms. Pertinent negatives for hypoglycemia include no dizziness or headaches. There are no diabetic associated symptoms. Pertinent negatives for diabetes include no chest pain, no fatigue, no polydipsia, no polyphagia, no polyuria and no weakness. There are no hypoglycemic complications. Risk factors for coronary artery disease include male sex and obesity. Current diabetic treatment includes oral agent (dual therapy) and diet. He is compliant with treatment all of the time. His weight is stable. Meal planning includes avoidance of concentrated sweets. He monitors blood glucose at home 1-2 x per day. Blood glucose monitoring compliance is excellent. His home blood glucose trend is decreasing steadily. His overall blood glucose range is 110-130 mg/dl. Past Medical History:   Diagnosis Date    Diabetes mellitus ([facility] [address]      Current Outpatient Medications on File Prior to Visit   Medication Sig Dispense Refill    glipiZIDE-metFORMIN (METAGLIP) 2.5-500 MG per tablet Take 1 tablet by mouth 2 times daily (before meals) 60 tablet 3    blood glucose monitor kit and supplies Dispense sufficient amount for indicated testing frequency plus additional to accommodate PRN testing needs. Dispense all needed supplies to include: monitor, strips, lancing device, lancets, control solutions, alcohol swabs. 1 kit 0    blood glucose monitor strips Test 1 times a day & as needed for symptoms of irregular blood glucose. Dispense sufficient amount for indicated testing frequency plus additional to accommodate PRN testing needs.  100 strip 5    Lancets MISC DX: diabetes mellitus. Use 1-3 time(s) daily - Ok to substitute per insurance (1 each = 1 box) 1 each 5     No current facility-administered medications on file prior to visit. No past surgical history on file. Family History   Problem Relation Age of Onset    Diabetes Neg Hx      Social History     Socioeconomic History    Marital status:      Spouse name: Not on file    Number of children: Not on file    Years of education: Not on file    Highest education level: Not on file   Occupational History    Not on file   Tobacco Use    Smoking status: Current Every Day Smoker     Packs/day: 1.50     Years: 25.00     Pack years: 37.50     Types: Cigarettes    Smokeless tobacco: Never Used   Substance and Sexual Activity    Alcohol use: Not on file    Drug use: Not on file    Sexual activity: Not on file   Other Topics Concern    Not on file   Social History Narrative    Not on file     Social Determinants of Health     Financial Resource Strain: Low Risk     Difficulty of Paying Living Expenses: Not hard at all   Food Insecurity: No Food Insecurity    Worried About Running Out of Food in the Last Year: Never true    [place] of Food in the Last Year: Never true   Transportation Needs:     Lack of Transportation (Medical): Not on file    Lack of Transportation (Non-Medical):  Not on file   Physical Activity:     Days of Exercise per Week: Not on file    Minutes of Exercise per Session: Not on file   Stress:     Feeling of Stress : Not on file   Social Connections:     Frequency of Communication with Friends and Family: Not on file    Frequency of Social Gatherings with Friends and Family: Not on file    Attends Shinto Services: Not on file    Active Member of Clubs or Organizations: Not on file    Attends Club or Organization Meetings: Not on file    Marital Status: Not on file   Intimate Partner Violence:     Fear of Current or Ex-Partner: Not on file   Antonino Emotionally Abused: Not on file    Physically Abused: Not on file    Sexually Abused: Not on file   Housing Stability:     Unable to Pay for Housing in the Last Year: Not on file    Number of Places Lived in the Last Year: Not on file    Unstable Housing in the Last Year: Not on file     Allergies:  Penicillins    Review of Systems   Constitutional: Negative for activity change, appetite change, chills, diaphoresis, fatigue, fever and unexpected weight change. HENT: Negative. Eyes: Negative. Respiratory: Negative for cough, shortness of breath and wheezing. Cardiovascular: Negative for chest pain, palpitations and leg swelling. Gastrointestinal: Negative. Negative for abdominal pain. Endocrine: Negative for polydipsia, polyphagia and polyuria. Genitourinary: Negative. Negative for dysuria. Musculoskeletal: Positive for back pain. Negative for gait problem and joint swelling. Back pain somewhat improved with med maria luz and muscle relaxer but some pain continues   Skin: Negative. Negative for color change. Neurological: Negative for dizziness, syncope, weakness, light-headedness, numbness and headaches. Psychiatric/Behavioral: Negative. Objective:    /72 (Site: Right Upper Arm, Position: Sitting, Cuff Size: Medium Adult)   Pulse 84   Temp 97.2 °F (36.2 °C) (Temporal)   Ht 5' 10\" (1.778 m)   Wt 234 lb (106.1 kg)   SpO2 97%   BMI 33.58 kg/m²     Physical Exam  Constitutional:       General: He is not in acute distress. HENT:      Head: Normocephalic and atraumatic. Right Ear: External ear normal.      Left Ear: External ear normal.      Mouth/Throat:      Mouth: Mucous membranes are moist.   Eyes:      Conjunctiva/sclera: Conjunctivae normal.   Cardiovascular:      Rate and Rhythm: Normal rate and regular rhythm. Heart sounds: Normal heart sounds. Pulmonary:      Effort: Pulmonary effort is normal. No respiratory distress.       Breath sounds: Normal breath sounds. Musculoskeletal:         General: Normal range of motion. Cervical back: Normal, normal range of motion and neck supple. No tenderness. Thoracic back: Normal.      Lumbar back: Tenderness present. No spasms or bony tenderness. Normal range of motion. Back:       Right lower leg: No edema. Left lower leg: No edema. Lymphadenopathy:      Cervical: No cervical adenopathy. Skin:     General: Skin is warm and dry. Findings: No erythema. Neurological:      General: No focal deficit present. Mental Status: He is alert and oriented to person, place, and time. Cranial Nerves: No cranial nerve deficit. Motor: No weakness. Gait: Gait normal.   Psychiatric:         Mood and Affect: Mood normal.         Behavior: Behavior normal.         Assessment & Plan:      1. Type 2 diabetes mellitus without complication, without long-term current use of insulin ([facility] [address]  Assessment & Plan:   At goal, continue current medications and continue current treatment plan  Orders:  -     Lipid Panel; Future  -     Comprehensive Metabolic Panel; Future  -     POCT glycosylated hemoglobin (Hb A1C)  -     Microalbumin / Creatinine Urine Ratio; Future  2. Acute midline low back pain with right-sided sciatica  Assessment & Plan:   Uncontrolled, continue current medications and continue current treatment plan  Orders:  -     XR LUMBAR SPINE (MIN 4 VIEWS);  Future      Orders Placed This Encounter   Procedures    XR LUMBAR SPINE (MIN 4 VIEWS)     Standing Status:   Future     Number of Occurrences:   1     Standing Expiration Date:   3/1/2023    Lipid Panel     Standing Status:   Future     Standing Expiration Date:   3/1/2023     Order Specific Question:   Is Patient Fasting?/# of Hours     Answer:   8    Comprehensive Metabolic Panel     Standing Status:   Future     Standing Expiration Date:   3/1/2023    Microalbumin / Creatinine Urine Ratio     Standing Status:   Future Standing Expiration Date:   3/1/2023    POCT glycosylated hemoglobin (Hb A1C)     No orders of the defined types were placed in this encounter. Medications Discontinued During This Encounter   Medication Reason    tamsulosin (FLOMAX) 0.4 MG capsule LIST CLEANUP    ondansetron (ZOFRAN-ODT) 4 MG disintegrating tablet Therapy completed    methylPREDNISolone (MEDROL, NAZANIN,) 4 MG tablet Therapy completed    ibuprofen (IBU) 400 MG tablet Therapy completed    acetaminophen (TYLENOL) 500 MG tablet Therapy completed     Return in about 3 months (around 6/1/2022). Reviewed with the patient: currentclinical status, medications, activities and diet. Side effects, adverse effects of the medicationprescribed today, as well as treatment plan/ rationale and result expectations havebeen discussed with the patient who expresses understanding and desires to proceed. Pt instructions reviewed and given to patient.     Close follow up to evaluate treatment resultsand for coordination of care. I have reviewed the patient's medical historyin detail and updated the computerized patient record.     Zoey Higgins, APRN - CNP

## 2022-03-02 DIAGNOSIS — M54.41 ACUTE MIDLINE LOW BACK PAIN WITH RIGHT-SIDED SCIATICA: Primary | ICD-10-CM

## 2022-03-07 ENCOUNTER — HOSPITAL ENCOUNTER (OUTPATIENT)
Dept: CT IMAGING | Age: 52
Discharge: HOME | End: 2022-03-09
Payer: COMMERCIAL

## 2022-03-07 ENCOUNTER — OFFICE VISIT (OUTPATIENT)
Dept: UROLOGY | Age: 52
End: 2022-03-07
Payer: COMMERCIAL

## 2022-03-07 ENCOUNTER — HOSPITAL ENCOUNTER (OUTPATIENT)
Dept: GENERAL RADIOLOGY | Age: 52
Discharge: HOME | End: 2022-03-09
Payer: COMMERCIAL

## 2022-03-07 VITALS
OXYGEN SATURATION: 96 % | HEART RATE: 73 BPM | DIASTOLIC BLOOD PRESSURE: 70 MMHG | BODY MASS INDEX: 33.5 KG/M2 | HEIGHT: 70 IN | WEIGHT: 234 LBS | SYSTOLIC BLOOD PRESSURE: 110 MMHG

## 2022-03-07 DIAGNOSIS — N20.0 RENAL CALCULUS, RIGHT: ICD-10-CM

## 2022-03-07 DIAGNOSIS — E11.9 TYPE 2 DIABETES MELLITUS WITHOUT COMPLICATION, WITHOUT LONG-TERM CURRENT USE OF INSULIN (HCC): ICD-10-CM

## 2022-03-07 DIAGNOSIS — N20.0 KIDNEY STONE: Primary | ICD-10-CM

## 2022-03-07 DIAGNOSIS — N20.0 KIDNEY STONE: ICD-10-CM

## 2022-03-07 DIAGNOSIS — N20.0 RENAL CALCULUS, RIGHT: Primary | ICD-10-CM

## 2022-03-07 LAB
ALBUMIN SERPL-MCNC: 4.4 G/DL (ref 3.5–4.6)
ALP BLD-CCNC: 55 U/L (ref 35–104)
ALT SERPL-CCNC: 17 U/L (ref 0–41)
ANION GAP SERPL CALCULATED.3IONS-SCNC: 13 MEQ/L (ref 9–15)
ANION GAP SERPL CALCULATED.3IONS-SCNC: 14 MEQ/L (ref 9–15)
AST SERPL-CCNC: 17 U/L (ref 0–40)
BILIRUB SERPL-MCNC: 0.6 MG/DL (ref 0.2–0.7)
BUN BLDV-MCNC: 15 MG/DL (ref 6–20)
BUN BLDV-MCNC: 16 MG/DL (ref 6–20)
CALCIUM SERPL-MCNC: 9.7 MG/DL (ref 8.5–9.9)
CALCIUM SERPL-MCNC: 9.8 MG/DL (ref 8.5–9.9)
CHLORIDE BLD-SCNC: 101 MEQ/L (ref 95–107)
CHLORIDE BLD-SCNC: 103 MEQ/L (ref 95–107)
CHOLESTEROL, TOTAL: 180 MG/DL (ref 0–199)
CO2: 21 MEQ/L (ref 20–31)
CO2: 22 MEQ/L (ref 20–31)
CREAT SERPL-MCNC: 1.09 MG/DL (ref 0.7–1.2)
CREAT SERPL-MCNC: 1.11 MG/DL (ref 0.7–1.2)
CREATININE URINE: 103 MG/DL
GFR AFRICAN AMERICAN: >60
GFR AFRICAN AMERICAN: >60
GFR NON-AFRICAN AMERICAN: >60
GFR NON-AFRICAN AMERICAN: >60
GLOBULIN: 3.1 G/DL (ref 2.3–3.5)
GLUCOSE BLD-MCNC: 133 MG/DL (ref 70–99)
GLUCOSE BLD-MCNC: 136 MG/DL (ref 70–99)
HCT VFR BLD CALC: 44.1 % (ref 42–52)
HDLC SERPL-MCNC: 46 MG/DL (ref 40–59)
HEMOGLOBIN: 15.5 G/DL (ref 14–18)
INR BLD: 1
LDL CHOLESTEROL CALCULATED: 115 MG/DL (ref 0–129)
MCH RBC QN AUTO: 31 PG (ref 27–31.3)
MCHC RBC AUTO-ENTMCNC: 35 % (ref 33–37)
MCV RBC AUTO: 88.4 FL (ref 80–100)
MICROALBUMIN UR-MCNC: 4.1 MG/DL
MICROALBUMIN/CREAT UR-RTO: 39.8 MG/G (ref 0–30)
PDW BLD-RTO: 14.1 % (ref 11.5–14.5)
PLATELET # BLD: 271 K/UL (ref 130–400)
POTASSIUM SERPL-SCNC: 4.2 MEQ/L (ref 3.4–4.9)
POTASSIUM SERPL-SCNC: 4.4 MEQ/L (ref 3.4–4.9)
PROTHROMBIN TIME: 13.3 SEC (ref 12.3–14.9)
RBC # BLD: 4.99 M/UL (ref 4.7–6.1)
SODIUM BLD-SCNC: 136 MEQ/L (ref 135–144)
SODIUM BLD-SCNC: 138 MEQ/L (ref 135–144)
TOTAL PROTEIN: 7.5 G/DL (ref 6.3–8)
TRIGL SERPL-MCNC: 94 MG/DL (ref 0–150)
WBC # BLD: 12.3 K/UL (ref 4.8–10.8)

## 2022-03-07 PROCEDURE — 74176 CT ABD & PELVIS W/O CONTRAST: CPT

## 2022-03-07 PROCEDURE — 74018 RADEX ABDOMEN 1 VIEW: CPT

## 2022-03-07 PROCEDURE — 99214 OFFICE O/P EST MOD 30 MIN: CPT | Performed by: UROLOGY

## 2022-03-07 RX ORDER — TAMSULOSIN HYDROCHLORIDE 0.4 MG/1
0.4 CAPSULE ORAL DAILY
Status: ON HOLD | COMMUNITY
End: 2022-03-08 | Stop reason: HOSPADM

## 2022-03-07 RX ORDER — ONDANSETRON 4 MG/1
4 TABLET, FILM COATED ORAL EVERY 8 HOURS PRN
COMMUNITY
End: 2022-06-01

## 2022-03-07 NOTE — PROGRESS NOTES
Stress:     Feeling of Stress : Not on file   Social Connections:     Frequency of Communication with Friends and Family: Not on file    Frequency of Social Gatherings with Friends and Family: Not on file    Attends Gnosticist Services: Not on file    Active Member of [address] [place] [address] [place] or Organizations: Not on file    Attends Club or Organization Meetings: Not on file    Marital Status: Not on file   Intimate Partner Violence:     Fear of Current or Ex-Partner: Not on file    Emotionally Abused: Not on file    Physically Abused: Not on file    Sexually Abused: Not on file   Housing Stability:     Unable to Pay for Housing in the Last Year: Not on file    Number of [place] in the Last Year: Not on file    Unstable Housing in the Last Year: Not on file     Family History   Problem Relation Age of Onset    Diabetes Neg Hx      Current Outpatient Medications   Medication Sig Dispense Refill    oxyCODONE-Acetaminophen (PERCOCET PO) Take by mouth      ondansetron (ZOFRAN) 4 MG tablet Take 4 mg by mouth every 8 hours as needed for Nausea or Vomiting      tamsulosin (FLOMAX) 0.4 MG capsule Take 0.4 mg by mouth daily      glipiZIDE-metFORMIN (METAGLIP) 2.5-500 MG per tablet Take 1 tablet by mouth 2 times daily (before meals) 60 tablet 3    blood glucose monitor kit and supplies Dispense sufficient amount for indicated testing frequency plus additional to accommodate PRN testing needs. Dispense all needed supplies to include: monitor, strips, lancing device, lancets, control solutions, alcohol swabs. 1 kit 0    blood glucose monitor strips Test 1 times a day & as needed for symptoms of irregular blood glucose. Dispense sufficient amount for indicated testing frequency plus additional to accommodate PRN testing needs. 100 strip 5    Lancets MISC DX: diabetes mellitus. Use 1-3 time(s) daily - Ok to substitute per insurance (1 each = 1 box) 1 each 5     No current facility-administered medications for this visit. Penicillins  All reviewed and verified by Dr Les Howell on today's visit    PSA   Date Value Ref Range Status   09/08/2021 0.62 0.00 - 4.00 ng/mL Final     Comment:     When the Total PSA is between 3.00 and 10.00 ng/mL, consider  requesting a Free PSA to aid in diagnosis. No results found for this visit on 03/07/22. Physical Exam  Vitals:    03/07/22 0849   BP: 110/70   Pulse: 73   SpO2: 96%   Weight: 234 lb (106.1 kg)   Height: 5' 10\" (1.778 m)     Constitutional: Mild distress. Cardiovascular: Normal rate, BP reviewed. Regular rhythm and rate  Pulmonary/Chest: Normal respiratory effort clear to auscultation percussion  Abdominal: Not distended. Normal bowel sounds  Urologic Exam  Unchanged. Musculoskeletal: Ambulatory. Extremities: No edema  Neurological: Intact no deficits  Lymphatics: No lymphadenopathy  Psychiatric: Alert oriented x3 normal affect. Assessment/Medical Necessity-Decision Making  Right renal colic  Right UPJ stone  Plan  Cystoscopy right double-J stent placement  Greater than 50% of 35 minutes spent consulting patient face-to-face  Orders Placed This Encounter   Procedures    CT ABDOMEN PELVIS WO CONTRAST Additional Contrast? None     Standing Status:   Future     Number of Occurrences:   1     Standing Expiration Date:   3/7/2023     Order Specific Question:   Additional Contrast?     Answer:   None     Order Specific Question:   Reason for exam:     Answer:   reight renal colic    CBC     Standing Status:   Future     Number of Occurrences:   1     Standing Expiration Date:   3/7/2023    Basic Metabolic Panel     Standing Status:   Future     Number of Occurrences:   1     Standing Expiration Date:   3/7/2023    Protime-INR     Standing Status:   Future     Number of Occurrences:   1     Standing Expiration Date:   3/7/2023     Order Specific Question:   Daily Coumadin Dose? Answer:   0     No orders of the defined types were placed in this encounter.     Jazimne Nguyen Lauro Officer, MD       Please note this report has been partially produced using speech recognition software  And may cause contain errors related to that system including grammar, punctuation and spelling as well as words and phrases that may seem inappropriate. If there are questions or concerns please feel free to contact me to clarify.

## 2022-03-08 ENCOUNTER — APPOINTMENT (OUTPATIENT)
Dept: GENERAL RADIOLOGY | Age: 52
End: 2022-03-08
Attending: UROLOGY
Payer: COMMERCIAL

## 2022-03-08 ENCOUNTER — ANESTHESIA (OUTPATIENT)
Dept: OPERATING ROOM | Age: 52
End: 2022-03-08
Payer: COMMERCIAL

## 2022-03-08 ENCOUNTER — ANESTHESIA EVENT (OUTPATIENT)
Dept: OPERATING ROOM | Age: 52
End: 2022-03-08
Payer: COMMERCIAL

## 2022-03-08 ENCOUNTER — HOSPITAL ENCOUNTER (OUTPATIENT)
Age: 52
Setting detail: OUTPATIENT SURGERY
Discharge: HOME | End: 2022-03-08
Attending: UROLOGY | Admitting: UROLOGY
Payer: COMMERCIAL

## 2022-03-08 VITALS
OXYGEN SATURATION: 94 % | HEIGHT: 70 IN | RESPIRATION RATE: 11 BRPM | BODY MASS INDEX: 33.5 KG/M2 | TEMPERATURE: 97.1 F | HEART RATE: 82 BPM | DIASTOLIC BLOOD PRESSURE: 83 MMHG | SYSTOLIC BLOOD PRESSURE: 122 MMHG | WEIGHT: 234 LBS

## 2022-03-08 VITALS — OXYGEN SATURATION: 93 % | TEMPERATURE: 98.6 F | SYSTOLIC BLOOD PRESSURE: 123 MMHG | DIASTOLIC BLOOD PRESSURE: 68 MMHG

## 2022-03-08 LAB
GLUCOSE BLD-MCNC: 154 MG/DL (ref 70–99)
GLUCOSE BLD-MCNC: 162 MG/DL (ref 70–99)
PERFORMED ON: ABNORMAL
PERFORMED ON: ABNORMAL

## 2022-03-08 PROCEDURE — 6370000000 HC RX 637 (ALT 250 FOR IP): Performed by: UROLOGY

## 2022-03-08 PROCEDURE — 6360000002 HC RX W HCPCS

## 2022-03-08 PROCEDURE — 2500000003 HC RX 250 WO HCPCS: Performed by: NURSE ANESTHETIST, CERTIFIED REGISTERED

## 2022-03-08 PROCEDURE — 7100000000 HC PACU RECOVERY - FIRST 15 MIN: Performed by: UROLOGY

## 2022-03-08 PROCEDURE — 2580000003 HC RX 258: Performed by: UROLOGY

## 2022-03-08 PROCEDURE — 6360000002 HC RX W HCPCS: Performed by: UROLOGY

## 2022-03-08 PROCEDURE — 7100000011 HC PHASE II RECOVERY - ADDTL 15 MIN: Performed by: UROLOGY

## 2022-03-08 PROCEDURE — 3700000000 HC ANESTHESIA ATTENDED CARE: Performed by: UROLOGY

## 2022-03-08 PROCEDURE — 74018 RADEX ABDOMEN 1 VIEW: CPT

## 2022-03-08 PROCEDURE — 2709999900 HC NON-CHARGEABLE SUPPLY: Performed by: UROLOGY

## 2022-03-08 PROCEDURE — 3600000004 HC SURGERY LEVEL 4 BASE: Performed by: UROLOGY

## 2022-03-08 PROCEDURE — C1769 GUIDE WIRE: HCPCS | Performed by: UROLOGY

## 2022-03-08 PROCEDURE — C1758 CATHETER, URETERAL: HCPCS | Performed by: UROLOGY

## 2022-03-08 PROCEDURE — C2617 STENT, NON-COR, TEM W/O DEL: HCPCS | Performed by: UROLOGY

## 2022-03-08 PROCEDURE — 7100000010 HC PHASE II RECOVERY - FIRST 15 MIN: Performed by: UROLOGY

## 2022-03-08 PROCEDURE — 3600000014 HC SURGERY LEVEL 4 ADDTL 15MIN: Performed by: UROLOGY

## 2022-03-08 PROCEDURE — 2580000003 HC RX 258

## 2022-03-08 PROCEDURE — 52330 CYSTOSCOPY AND TREATMENT: CPT | Performed by: UROLOGY

## 2022-03-08 PROCEDURE — 3700000001 HC ADD 15 MINUTES (ANESTHESIA): Performed by: UROLOGY

## 2022-03-08 PROCEDURE — 52332 CYSTOSCOPY AND TREATMENT: CPT | Performed by: UROLOGY

## 2022-03-08 PROCEDURE — 74420 UROGRAPHY RTRGR +-KUB: CPT | Performed by: UROLOGY

## 2022-03-08 PROCEDURE — A4217 STERILE WATER/SALINE, 500 ML: HCPCS | Performed by: UROLOGY

## 2022-03-08 PROCEDURE — 6360000004 HC RX CONTRAST MEDICATION: Performed by: UROLOGY

## 2022-03-08 PROCEDURE — 6360000002 HC RX W HCPCS: Performed by: NURSE ANESTHETIST, CERTIFIED REGISTERED

## 2022-03-08 PROCEDURE — 76000 FLUOROSCOPY <1 HR PHYS/QHP: CPT

## 2022-03-08 PROCEDURE — 7100000001 HC PACU RECOVERY - ADDTL 15 MIN: Performed by: UROLOGY

## 2022-03-08 DEVICE — IMPLANTABLE DEVICE: Type: IMPLANTABLE DEVICE | Site: URETER | Status: FUNCTIONAL

## 2022-03-08 RX ORDER — DIPHENHYDRAMINE HYDROCHLORIDE 50 MG/ML
12.5 INJECTION INTRAMUSCULAR; INTRAVENOUS
Status: DISCONTINUED | OUTPATIENT
Start: 2022-03-08 | End: 2022-03-08 | Stop reason: HOSPADM

## 2022-03-08 RX ORDER — SODIUM CHLORIDE, SODIUM LACTATE, POTASSIUM CHLORIDE, CALCIUM CHLORIDE 600; 310; 30; 20 MG/100ML; MG/100ML; MG/100ML; MG/100ML
INJECTION, SOLUTION INTRAVENOUS CONTINUOUS
Status: DISCONTINUED | OUTPATIENT
Start: 2022-03-08 | End: 2022-03-08

## 2022-03-08 RX ORDER — MEPERIDINE HYDROCHLORIDE 25 MG/ML
12.5 INJECTION INTRAMUSCULAR; INTRAVENOUS; SUBCUTANEOUS EVERY 5 MIN PRN
Status: DISCONTINUED | OUTPATIENT
Start: 2022-03-08 | End: 2022-03-08 | Stop reason: HOSPADM

## 2022-03-08 RX ORDER — FENTANYL CITRATE 50 UG/ML
INJECTION, SOLUTION INTRAMUSCULAR; INTRAVENOUS PRN
Status: DISCONTINUED | OUTPATIENT
Start: 2022-03-08 | End: 2022-03-08 | Stop reason: SDUPTHER

## 2022-03-08 RX ORDER — ONDANSETRON 2 MG/ML
4 INJECTION INTRAMUSCULAR; INTRAVENOUS
Status: DISCONTINUED | OUTPATIENT
Start: 2022-03-08 | End: 2022-03-08 | Stop reason: HOSPADM

## 2022-03-08 RX ORDER — MAGNESIUM HYDROXIDE 1200 MG/15ML
LIQUID ORAL CONTINUOUS PRN
Status: COMPLETED | OUTPATIENT
Start: 2022-03-08 | End: 2022-03-08

## 2022-03-08 RX ORDER — PROPOFOL 10 MG/ML
INJECTION, EMULSION INTRAVENOUS PRN
Status: DISCONTINUED | OUTPATIENT
Start: 2022-03-08 | End: 2022-03-08 | Stop reason: SDUPTHER

## 2022-03-08 RX ORDER — LABETALOL HYDROCHLORIDE 5 MG/ML
10 INJECTION, SOLUTION INTRAVENOUS
Status: DISCONTINUED | OUTPATIENT
Start: 2022-03-08 | End: 2022-03-08 | Stop reason: HOSPADM

## 2022-03-08 RX ORDER — FENTANYL CITRATE 50 UG/ML
25 INJECTION, SOLUTION INTRAMUSCULAR; INTRAVENOUS EVERY 5 MIN PRN
Status: DISCONTINUED | OUTPATIENT
Start: 2022-03-08 | End: 2022-03-08 | Stop reason: HOSPADM

## 2022-03-08 RX ORDER — MIDAZOLAM HYDROCHLORIDE 1 MG/ML
INJECTION INTRAMUSCULAR; INTRAVENOUS PRN
Status: DISCONTINUED | OUTPATIENT
Start: 2022-03-08 | End: 2022-03-08 | Stop reason: SDUPTHER

## 2022-03-08 RX ORDER — LIDOCAINE HYDROCHLORIDE 20 MG/ML
INJECTION, SOLUTION INFILTRATION; PERINEURAL PRN
Status: DISCONTINUED | OUTPATIENT
Start: 2022-03-08 | End: 2022-03-08 | Stop reason: SDUPTHER

## 2022-03-08 RX ORDER — OXYCODONE HYDROCHLORIDE 5 MG/1
5 TABLET ORAL PRN
Status: DISCONTINUED | OUTPATIENT
Start: 2022-03-08 | End: 2022-03-08 | Stop reason: HOSPADM

## 2022-03-08 RX ORDER — HYDRALAZINE HYDROCHLORIDE 20 MG/ML
10 INJECTION INTRAMUSCULAR; INTRAVENOUS
Status: DISCONTINUED | OUTPATIENT
Start: 2022-03-08 | End: 2022-03-08 | Stop reason: HOSPADM

## 2022-03-08 RX ORDER — ONDANSETRON 2 MG/ML
INJECTION INTRAMUSCULAR; INTRAVENOUS
Status: COMPLETED
Start: 2022-03-08 | End: 2022-03-08

## 2022-03-08 RX ORDER — SODIUM CHLORIDE 9 MG/ML
25 INJECTION, SOLUTION INTRAVENOUS PRN
Status: DISCONTINUED | OUTPATIENT
Start: 2022-03-08 | End: 2022-03-08 | Stop reason: HOSPADM

## 2022-03-08 RX ORDER — DIPHENHYDRAMINE HYDROCHLORIDE 50 MG/ML
25 INJECTION INTRAMUSCULAR; INTRAVENOUS ONCE
Status: COMPLETED | OUTPATIENT
Start: 2022-03-08 | End: 2022-03-08

## 2022-03-08 RX ORDER — PROCHLORPERAZINE EDISYLATE 5 MG/ML
5 INJECTION INTRAMUSCULAR; INTRAVENOUS
Status: DISCONTINUED | OUTPATIENT
Start: 2022-03-08 | End: 2022-03-08 | Stop reason: HOSPADM

## 2022-03-08 RX ORDER — SODIUM CHLORIDE, SODIUM LACTATE, POTASSIUM CHLORIDE, CALCIUM CHLORIDE 600; 310; 30; 20 MG/100ML; MG/100ML; MG/100ML; MG/100ML
INJECTION, SOLUTION INTRAVENOUS
Status: COMPLETED
Start: 2022-03-08 | End: 2022-03-08

## 2022-03-08 RX ORDER — SULFAMETHOXAZOLE AND TRIMETHOPRIM 800; 160 MG/1; MG/1
1 TABLET ORAL 2 TIMES DAILY
Qty: 14 TABLET | Refills: 0 | Status: SHIPPED | OUTPATIENT
Start: 2022-03-08 | End: 2022-03-15

## 2022-03-08 RX ORDER — SODIUM CHLORIDE 0.9 % (FLUSH) 0.9 %
5-40 SYRINGE (ML) INJECTION PRN
Status: DISCONTINUED | OUTPATIENT
Start: 2022-03-08 | End: 2022-03-08 | Stop reason: HOSPADM

## 2022-03-08 RX ORDER — LIDOCAINE HYDROCHLORIDE 20 MG/ML
JELLY TOPICAL PRN
Status: DISCONTINUED | OUTPATIENT
Start: 2022-03-08 | End: 2022-03-08 | Stop reason: ALTCHOICE

## 2022-03-08 RX ORDER — KETOROLAC TROMETHAMINE 30 MG/ML
INJECTION, SOLUTION INTRAMUSCULAR; INTRAVENOUS PRN
Status: DISCONTINUED | OUTPATIENT
Start: 2022-03-08 | End: 2022-03-08 | Stop reason: SDUPTHER

## 2022-03-08 RX ORDER — DIPHENHYDRAMINE HYDROCHLORIDE 50 MG/ML
INJECTION INTRAMUSCULAR; INTRAVENOUS
Status: COMPLETED
Start: 2022-03-08 | End: 2022-03-08

## 2022-03-08 RX ORDER — DEXAMETHASONE SODIUM PHOSPHATE 10 MG/ML
INJECTION INTRAMUSCULAR; INTRAVENOUS PRN
Status: DISCONTINUED | OUTPATIENT
Start: 2022-03-08 | End: 2022-03-08 | Stop reason: SDUPTHER

## 2022-03-08 RX ORDER — ONDANSETRON 2 MG/ML
4 INJECTION INTRAMUSCULAR; INTRAVENOUS ONCE
Status: COMPLETED | OUTPATIENT
Start: 2022-03-08 | End: 2022-03-08

## 2022-03-08 RX ORDER — SODIUM CHLORIDE 0.9 % (FLUSH) 0.9 %
5-40 SYRINGE (ML) INJECTION EVERY 12 HOURS SCHEDULED
Status: DISCONTINUED | OUTPATIENT
Start: 2022-03-08 | End: 2022-03-08 | Stop reason: HOSPADM

## 2022-03-08 RX ORDER — SODIUM CHLORIDE, SODIUM LACTATE, POTASSIUM CHLORIDE, CALCIUM CHLORIDE 600; 310; 30; 20 MG/100ML; MG/100ML; MG/100ML; MG/100ML
INJECTION, SOLUTION INTRAVENOUS CONTINUOUS
Status: DISCONTINUED | OUTPATIENT
Start: 2022-03-08 | End: 2022-03-08 | Stop reason: HOSPADM

## 2022-03-08 RX ORDER — FENTANYL CITRATE 50 UG/ML
50 INJECTION, SOLUTION INTRAMUSCULAR; INTRAVENOUS EVERY 5 MIN PRN
Status: DISCONTINUED | OUTPATIENT
Start: 2022-03-08 | End: 2022-03-08 | Stop reason: HOSPADM

## 2022-03-08 RX ORDER — OXYCODONE HYDROCHLORIDE 5 MG/1
10 TABLET ORAL PRN
Status: DISCONTINUED | OUTPATIENT
Start: 2022-03-08 | End: 2022-03-08 | Stop reason: HOSPADM

## 2022-03-08 RX ADMIN — KETOROLAC TROMETHAMINE 30 MG: 30 INJECTION, SOLUTION INTRAMUSCULAR; INTRAVENOUS at 07:36

## 2022-03-08 RX ADMIN — MIDAZOLAM HYDROCHLORIDE 2 MG: 1 INJECTION, SOLUTION INTRAMUSCULAR; INTRAVENOUS at 07:29

## 2022-03-08 RX ADMIN — SODIUM CHLORIDE, POTASSIUM CHLORIDE, SODIUM LACTATE AND CALCIUM CHLORIDE: 600; 310; 30; 20 INJECTION, SOLUTION INTRAVENOUS at 07:09

## 2022-03-08 RX ADMIN — FENTANYL CITRATE 50 MCG: 50 INJECTION, SOLUTION INTRAMUSCULAR; INTRAVENOUS at 07:29

## 2022-03-08 RX ADMIN — PROPOFOL 100 MG: 10 INJECTION, EMULSION INTRAVENOUS at 07:36

## 2022-03-08 RX ADMIN — DIPHENHYDRAMINE HYDROCHLORIDE 25 MG: 50 INJECTION INTRAMUSCULAR; INTRAVENOUS at 07:07

## 2022-03-08 RX ADMIN — GENTAMICIN SULFATE 120 MG: 40 INJECTION, SOLUTION INTRAMUSCULAR; INTRAVENOUS at 07:29

## 2022-03-08 RX ADMIN — SODIUM CHLORIDE, SODIUM LACTATE, POTASSIUM CHLORIDE, CALCIUM CHLORIDE: 600; 310; 30; 20 INJECTION, SOLUTION INTRAVENOUS at 07:09

## 2022-03-08 RX ADMIN — DEXAMETHASONE SODIUM PHOSPHATE 5 MG: 10 INJECTION INTRAMUSCULAR; INTRAVENOUS at 07:36

## 2022-03-08 RX ADMIN — ONDANSETRON 4 MG: 2 INJECTION INTRAMUSCULAR; INTRAVENOUS at 07:05

## 2022-03-08 RX ADMIN — LIDOCAINE HYDROCHLORIDE 50 MG: 20 INJECTION, SOLUTION INFILTRATION; PERINEURAL at 07:31

## 2022-03-08 RX ADMIN — PROPOFOL 150 MG: 10 INJECTION, EMULSION INTRAVENOUS at 07:31

## 2022-03-08 RX ADMIN — FENTANYL CITRATE 50 MCG: 50 INJECTION, SOLUTION INTRAMUSCULAR; INTRAVENOUS at 07:36

## 2022-03-08 ASSESSMENT — PULMONARY FUNCTION TESTS
PIF_VALUE: 11
PIF_VALUE: 1
PIF_VALUE: 1
PIF_VALUE: 11
PIF_VALUE: 11
PIF_VALUE: 3
PIF_VALUE: 11
PIF_VALUE: 2
PIF_VALUE: 1
PIF_VALUE: 3
PIF_VALUE: 1
PIF_VALUE: 11
PIF_VALUE: 13
PIF_VALUE: 1
PIF_VALUE: 20
PIF_VALUE: 11
PIF_VALUE: 1
PIF_VALUE: 2
PIF_VALUE: 1
PIF_VALUE: 1
PIF_VALUE: 11
PIF_VALUE: 11
PIF_VALUE: 12
PIF_VALUE: 4
PIF_VALUE: 10
PIF_VALUE: 11
PIF_VALUE: 1
PIF_VALUE: 11
PIF_VALUE: 3
PIF_VALUE: 0
PIF_VALUE: 1

## 2022-03-08 ASSESSMENT — PAIN SCALES - GENERAL: PAINLEVEL_OUTOF10: 0

## 2022-03-08 ASSESSMENT — PAIN - FUNCTIONAL ASSESSMENT: PAIN_FUNCTIONAL_ASSESSMENT: 0-10

## 2022-03-08 NOTE — ANESTHESIA PRE PROCEDURE
Department of Anesthesiology  Preprocedure Note       Name:  Frankey Hidalgo   Age:  49 y.o.  :  1970                                          MRN:  [id number]         Date:  3/8/2022      Surgeon: Janki Crockett):  Sejal Turner MD    Procedure: Procedure(s):  CYSTOSCOPY RIGHT DOUBLE J STENT PLACEMENT (LABS DONE 3/7/22/)  KUB ON ARRIVAL    Medications prior to admission:   Prior to Admission medications    Medication Sig Start Date End Date Taking? Authorizing Provider   oxyCODONE-Acetaminophen (PERCOCET PO) Take by mouth   Yes Historical Provider, MD   ondansetron (ZOFRAN) 4 MG tablet Take 4 mg by mouth every 8 hours as needed for Nausea or Vomiting   Yes Historical Provider, MD   tamsulosin (FLOMAX) 0.4 MG capsule Take 0.4 mg by mouth daily   Yes Historical Provider, MD   glipiZIDE-metFORMIN (METAGLIP) 2.5-500 MG per tablet Take 1 tablet by mouth 2 times daily (before meals) 22  Yes FANNY Jaramillo CNP   blood glucose monitor kit and supplies Dispense sufficient amount for indicated testing frequency plus additional to accommodate PRN testing needs. Dispense all needed supplies to include: monitor, strips, lancing device, lancets, control solutions, alcohol swabs. 21   FANNY Jaramillo CNP   blood glucose monitor strips Test 1 times a day & as needed for symptoms of irregular blood glucose. Dispense sufficient amount for indicated testing frequency plus additional to accommodate PRN testing needs. 21   FANNY Jaramillo CNP   Lancets MISC DX: diabetes mellitus.  Use 1-3 time(s) daily - Ok to substitute per insurance (1 each = 1 box) 21   FANNY Jaramillo CNP       Current medications:    Current Facility-Administered Medications   Medication Dose Route Frequency Provider Last Rate Last Admin    gentamicin (GARAMYCIN) 120 mg in dextrose 5 % 100 mL IVPB  120 mg IntraVENous On Call to Marla Younger 13MD Mireya        lactated ringers infusion             lactated ringers infusion   IntraVENous Continuous Goevanni Perez MD           Allergies: Allergies   Allergen Reactions    Penicillins Rash       Problem List:    Patient Active Problem List   Diagnosis Code    New onset type 2 diabetes mellitus ([facility] E11.9    Acute midline low back pain with right-sided sciatica M54.41       Past Medical History:        Diagnosis Date    Diabetes mellitus ([facility]        Past Surgical History:        Procedure Laterality Date    ANTERIOR CRUCIATE LIGAMENT REPAIR Right     and MCL       Social History:    Social History     Tobacco Use    Smoking status: Current Every Day Smoker     Packs/day: 1.50     Years: 25.00     Pack years: 37.50     Types: Cigarettes    Smokeless tobacco: Never Used   Substance Use Topics    Alcohol use: Not on file                                Ready to quit: Not Answered  Counseling given: Not Answered      Vital Signs (Current):   Vitals:    03/08/22 0629 03/08/22 0640   BP: (!) 146/84    Pulse: 78 78   Resp: 18    Temp: 96.3 °F (35.7 °C)    TempSrc: Skin    SpO2: 97%    Weight: 234 lb (106.1 kg)    Height: 5' 10\" (1.778 m)                                               BP Readings from Last 3 Encounters:   03/08/22 (!) 146/84   03/07/22 110/70   03/01/22 126/72       NPO Status: Time of last liquid consumption: 2200                        Time of last solid consumption: 2200                        Date of last liquid consumption: 03/07/22                        Date of last solid food consumption: 03/07/22    BMI:   Wt Readings from Last 3 Encounters:   03/08/22 234 lb (106.1 kg)   03/07/22 234 lb (106.1 kg)   03/01/22 234 lb (106.1 kg)     Body mass index is 33.58 kg/m².     CBC:   Lab Results   Component Value Date    WBC 12.3 03/07/2022    RBC 4.99 03/07/2022    HGB 15.5 03/07/2022    HCT 44.1 03/07/2022    MCV 88.4 03/07/2022    RDW 14.1 03/07/2022     03/07/2022       CMP:   Lab Results   Component Value Date     03/07/2022    K 4.2 03/07/2022  03/07/2022    CO2 21 03/07/2022    BUN 16 03/07/2022    CREATININE 1.11 03/07/2022    GFRAA >60.0 03/07/2022    LABGLOM >60.0 03/07/2022    GLUCOSE 133 03/07/2022    PROT 7.5 03/07/2022    CALCIUM 9.7 03/07/2022    BILITOT 0.6 03/07/2022    ALKPHOS 55 03/07/2022    AST 17 03/07/2022    ALT 17 03/07/2022       POC Tests:   Recent Labs     03/08/22  7067   POCGLU 162*       Coags:   Lab Results   Component Value Date    PROTIME 13.3 03/07/2022    INR 1.0 03/07/2022       HCG (If Applicable): No results found for: PREGTESTUR, PREGSERUM, HCG, HCGQUANT     ABGs: No results found for: PHART, PO2ART, GFC2SNP, XYU6XKU, BEART, F4IWKBYD     Type & Screen (If Applicable):  No results found for: LABABO, LABRH    Drug/Infectious Status (If Applicable):  No results found for: HIV, HEPCAB    COVID-19 Screening (If Applicable): No results found for: COVID19        Anesthesia Evaluation  Patient summary reviewed and Nursing notes reviewed no history of anesthetic complications:   Airway: Mallampati: III  TM distance: >3 FB   Neck ROM: full  Mouth opening: > = 3 FB Dental: normal exam         Pulmonary:Negative Pulmonary ROS and normal exam                               Cardiovascular:Negative CV ROS  Exercise tolerance: good (>4 METS),         ECG reviewed               Beta Blocker:  Not on Beta Blocker         Neuro/Psych:   Negative Neuro/Psych ROS              GI/Hepatic/Renal: Neg GI/Hepatic/Renal ROS  (+) morbid obesity          Endo/Other: Negative Endo/Other ROS   (+) DiabetesType II DM, , .          Pt had PAT visit. Abdominal:             Vascular: negative vascular ROS. Other Findings:             Anesthesia Plan      general     ASA 2     (LMA)  Induction: intravenous. MIPS: Postoperative opioids intended and Prophylactic antiemetics administered. Anesthetic plan and risks discussed with patient. Plan discussed with CRNA.     Attending anesthesiologist reviewed and agrees with Pre Eval content            Josie Saini MD   3/8/2022

## 2022-03-08 NOTE — OP NOTE
Natalie Rascon [address] [place] [address], [address] [facility]                                OPERATIVE REPORT    PATIENT NAME: Josie Angeles                         :        1970  MED REC NO:   [id number]                            ROOM:  ACCOUNT NO:   [de-identified]                           ADMIT DATE: 2022  PROVIDER:     Bhumika Betancourt MD    DATE OF PROCEDURE:  2022    PREOPERATIVE DIAGNOSIS:  Right renal obstruction secondary to a ureteral  calculus with intractable pain, nausea and vomiting. POSTOPERATIVE DIAGNOSIS:  Right renal obstruction secondary to a  ureteral calculus with intractable pain, nausea and vomiting. OPERATIONS PERFORMED:  Cystoscopy, right retrograde pyelogram,  manipulation of right ureteral stone without removal, right double-J  stent placement. SURGEON:  Luigi Alvarado. Chi Garcia MD    ANESTHESIA:  General.    ESTIMATED BLOOD LOSS:  Not applicable. INDICATIONS:  The patient is a 71-year-old male with a 1-cm stone at the  right UPJ with intractable pain. The patient was seen in the office  yesterday and scheduled for elective double-J stent placement this  morning. The patient understands risks and benefits, and wants to  proceed. OPERATIVE NOTE:  The patient received preoperative IV antibiotics. He  was taken to the operating room, placed on the table in dorsal position. General anesthetic was induced. Time-out was taken. He was prepped and  draped in sterile fashion. A 21-S[place] cystoscope was used to perform a  cystoscopy. Penile urethra was within normal limits. Prostatic urethra  showed minimal obstruction. Examination of bladder showed no evidence  of tumors, stones, and/or abnormalities. Right ureter was effluxing  minimal urine. A guidewire was used to cannulate the distal right  ureter with a 6-S[place] Arabian angled tip ureteral catheter. This catheter was  advanced all the way up to the stone.   Multiple attempts at pushing a  wire through the stone location failed. At this time, a retrograde  pyelograms obtained, which showed severe obstruction with impaction. Contrast was injected under high pressure and the tip of the ureteral  catheter was used to manipulate the stone superiorly. The stone was  noted to flush up into the right renal pelvis. At this point, a 6 x 26  double-J stent was placed into the right collecting system. Good curl  was observed in the renal pelvis and the bladder. Bladder was then  drained. A Jim catheter was placed for later removal.  The patient  tolerated procedure well. There were no complications.         Tete Ramirez MD    D: 03/08/2022 8:06:45       T: 03/08/2022 8:08:57     [email or web address]  Job#: [id number]     Doc#: [id number]    CC:

## 2022-03-08 NOTE — PROGRESS NOTES
Pt given discharge instructions at this time resting comfortay scripts called to ailyn JOSEPH at bedside to speak with  Pt.

## 2022-03-08 NOTE — ANESTHESIA POSTPROCEDURE EVALUATION
Department of Anesthesiology  Postprocedure Note    Patient: Alisa Kessler  MRN: [id number]  YOB: 1970  Date of evaluation: 3/8/2022  Time:  8:05 AM     Procedure Summary     Date: 03/08/22 Room / Location: [facility]    Anesthesia Start: 5642 Anesthesia Stop:     Procedure: CYSTOSCOPY RIGHT DOUBLE J STENT PLACEMENT (Right ) Diagnosis: (RIGHT RENAL CALCULUS)    Surgeons: Hawa Blakely MD Responsible Provider: Elizabeth Villa MD    Anesthesia Type: general ASA Status: 2          Anesthesia Type: No value filed. Danyell Phase I: Danyell Score: 5    Danyell Phase II:      Last vitals: Reviewed and per EMR flowsheets.        Anesthesia Post Evaluation    Patient location during evaluation: PACU  Patient participation: complete - patient participated  Level of consciousness: awake  Pain score: 0  Airway patency: patent  Nausea & Vomiting: no nausea  Complications: no  Cardiovascular status: hemodynamically stable  Respiratory status: acceptable  Hydration status: stable

## 2022-03-08 NOTE — BRIEF OP NOTE
Brief Postoperative Note      Patient: Kenny Son  YOB: 1970  MRN: [id number]    Date of Procedure: 3/8/2022    Pre-Op Diagnosis: RIGHT RENAL CALCULUS    Post-Op Diagnosis: Same       Procedure(s):  CYSTOSCOPY RIGHT DOUBLE J STENT PLACEMENT  Manipulation of right ureteral calculus without removal  Surgeon(s):  Enid Jackson MD    Assistant:  First Assistant: Fabi Velez    Anesthesia: Monitor Anesthesia Care    Estimated Blood Loss (mL): Minimal    Complications: None    Specimens:   * No specimens in log *    Implants:  Implant Name Type Inv.  Item Serial No.  Lot No. LRB No. Used Action   STENT UTER 26CM 6FR W/GDWIRE [[id number]] [[employer]] - [id number]  STENT UTER 26CM 6FR W/GDWIRE [[id number]] [[employer] [address] [place] [address] DIV- [id number] Right 1 Implanted         Drains:   Urethral Catheter Non-latex 16 fr (Active)       Findings: Normal cystoscopy  6 x 26 double-J stent  Severe obstruction with hydronephrotic drip    Electronically signed by Enid Jackson MD on 3/8/2022 at 8:00 AM

## 2022-03-31 ENCOUNTER — HOSPITAL ENCOUNTER (OUTPATIENT)
Dept: GENERAL RADIOLOGY | Age: 52
Discharge: HOME | End: 2022-04-02
Payer: COMMERCIAL

## 2022-03-31 ENCOUNTER — OFFICE VISIT (OUTPATIENT)
Dept: UROLOGY | Age: 52
End: 2022-03-31
Payer: COMMERCIAL

## 2022-03-31 VITALS
HEIGHT: 70 IN | WEIGHT: 234 LBS | HEART RATE: 98 BPM | OXYGEN SATURATION: 97 % | BODY MASS INDEX: 33.5 KG/M2 | SYSTOLIC BLOOD PRESSURE: 110 MMHG | DIASTOLIC BLOOD PRESSURE: 86 MMHG

## 2022-03-31 DIAGNOSIS — N20.1 RIGHT URETERAL CALCULUS: Primary | ICD-10-CM

## 2022-03-31 DIAGNOSIS — N20.0 KIDNEY STONE: ICD-10-CM

## 2022-03-31 DIAGNOSIS — N20.0 KIDNEY STONE: Primary | ICD-10-CM

## 2022-03-31 PROCEDURE — 74018 RADEX ABDOMEN 1 VIEW: CPT

## 2022-03-31 PROCEDURE — 99213 OFFICE O/P EST LOW 20 MIN: CPT | Performed by: UROLOGY

## 2022-03-31 NOTE — PROGRESS NOTES
[facility] EVALUATION NOTE                                                 H&P                                                                                                                                                 Reason for Visit  Right renal calculus    History of Present Illness  Status post right double-J stent placement for obstruction secondary to a 9 mm stone  Patient here to schedule shockwave lithotripsy  KUB shows a stone in the right lower pole      Urologic Review of Systems/Symptoms  Frequency of urination    Review of Systems  Hospitalization: Recent stent placement  All 14 categories of Review of Systems otherwise reviewed no other findings reported.   No change in review of systems since last visit  Past Medical History:   Diagnosis Date    Diabetes mellitus ([address]      Past Surgical History:   Procedure Laterality Date    ANTERIOR CRUCIATE LIGAMENT REPAIR Right     and MCL    BLADDER SURGERY Right 3/8/2022    CYSTOSCOPY RIGHT DOUBLE J STENT PLACEMENT performed by Radha Fleming MD at [address] [place] [address] History     Socioeconomic History    Marital status:      Spouse name: None    Number of children: None    Years of education: None    Highest education level: None   Occupational History    None   Tobacco Use    Smoking status: Current Every Day Smoker     Packs/day: 1.50     Years: 25.00     Pack years: 37.50     Types: Cigarettes    Smokeless tobacco: Never Used   Substance and Sexual Activity    Alcohol use: None    Drug use: None    Sexual activity: None   Other Topics Concern    None   Social History Narrative    None     Social Determinants of Health     Financial Resource Strain: Low Risk     Difficulty of Paying Living Expenses: Not hard at all   Food Insecurity: No Food Insecurity    Worried About Running Out of Food in the Last Year: Never true    Pippa of Food in the Last Year: Never true   Transportation Needs:     Lack of Transportation (Medical): Not on file    Lack of Transportation (Non-Medical): Not on file   Physical Activity:     Days of Exercise per Week: Not on file    Minutes of Exercise per Session: Not on file   Stress:     Feeling of Stress : Not on file   Social Connections:     Frequency of Communication with Friends and Family: Not on file    Frequency of Social Gatherings with Friends and Family: Not on file    Attends Christian Services: Not on file    Active Member of [address] [place] [address] [employer] or Organizations: Not on file    Attends Club or Organization Meetings: Not on file    Marital Status: Not on file   Intimate Partner Violence:     Fear of Current or Ex-Partner: Not on file    Emotionally Abused: Not on file    Physically Abused: Not on file    Sexually Abused: Not on file   Housing Stability:     Unable to Pay for Housing in the Last Year: Not on file    Number of [place] in the Last Year: Not on file    Unstable Housing in the Last Year: Not on file     Family History   Problem Relation Age of Onset    Diabetes Neg Hx      Current Outpatient Medications   Medication Sig Dispense Refill    glipiZIDE-metFORMIN (METAGLIP) 2.5-500 MG per tablet Take 1 tablet by mouth 2 times daily (before meals) 60 tablet 3    blood glucose monitor kit and supplies Dispense sufficient amount for indicated testing frequency plus additional to accommodate PRN testing needs. Dispense all needed supplies to include: monitor, strips, lancing device, lancets, control solutions, alcohol swabs. 1 kit 0    blood glucose monitor strips Test 1 times a day & as needed for symptoms of irregular blood glucose. Dispense sufficient amount for indicated testing frequency plus additional to accommodate PRN testing needs. 100 strip 5    Lancets MISC DX: diabetes mellitus.  Use 1-3 time(s) daily - Ok to substitute per insurance (1 each = 1 box) 1 each 5    oxyCODONE-Acetaminophen (PERCOCET PO) Take by mouth (Patient not taking: Reported on 3/31/2022)  ondansetron (ZOFRAN) 4 MG tablet Take 4 mg by mouth every 8 hours as needed for Nausea or Vomiting (Patient not taking: Reported on 3/31/2022)       No current facility-administered medications for this visit. Penicillins  All reviewed and verified by Dr Santos Garza on today's visit    PSA   Date Value Ref Range Status   09/08/2021 0.62 0.00 - 4.00 ng/mL Final     Comment:     When the Total PSA is between 3.00 and 10.00 ng/mL, consider  requesting a Free PSA to aid in diagnosis. No results found for this visit on 03/31/22. Physical Exam  Vitals:    03/31/22 1414   BP: 110/86   Pulse: 98   SpO2: 97%   Weight: 234 lb (106.1 kg)   Height: 5' 10\" (1.778 m)     Constitutional: Not in distress. Cardiovascular: Normal rate, BP reviewed. Unchanged  Pulmonary/Chest: Normal respiratory effort not short of breath  Abdominal: Not distended. Urologic Exam  KUB reviewed. Musculoskeletal: Ambulatory. Extremities: No edema  Neurological: No deficits  Lymphatics: Normal  Psychiatric: Alert and oriented x3. Assessment/Medical Necessity-Decision Making  Right lower pole calculus with indwelling double-J stent  Plan  Right-sided shockwave lithotripsy with planned stent removal  All risks and benefits explained in detail  Preoperative evaluation  Greater than 50% of 20 minutes spent consulting patient face-to-face  No orders of the defined types were placed in this encounter. No orders of the defined types were placed in this encounter. Rod Fabian MD       Please note this report has been partially produced using speech recognition software  And may cause contain errors related to that system including grammar, punctuation and spelling as well as words and phrases that may seem inappropriate. If there are questions or concerns please feel free to contact me to clarify.

## 2022-04-06 ENCOUNTER — HOSPITAL ENCOUNTER (OUTPATIENT)
Age: 52
Setting detail: OUTPATIENT SURGERY
Discharge: HOME | End: 2022-04-06
Attending: UROLOGY | Admitting: UROLOGY
Payer: COMMERCIAL

## 2022-04-06 ENCOUNTER — APPOINTMENT (OUTPATIENT)
Dept: GENERAL RADIOLOGY | Age: 52
End: 2022-04-06
Attending: UROLOGY
Payer: COMMERCIAL

## 2022-04-06 ENCOUNTER — ANESTHESIA (OUTPATIENT)
Dept: OPERATING ROOM | Age: 52
End: 2022-04-06
Payer: COMMERCIAL

## 2022-04-06 ENCOUNTER — ANESTHESIA EVENT (OUTPATIENT)
Dept: OPERATING ROOM | Age: 52
End: 2022-04-06
Payer: COMMERCIAL

## 2022-04-06 VITALS
TEMPERATURE: 97.4 F | BODY MASS INDEX: 33.64 KG/M2 | RESPIRATION RATE: 16 BRPM | DIASTOLIC BLOOD PRESSURE: 72 MMHG | SYSTOLIC BLOOD PRESSURE: 109 MMHG | WEIGHT: 235 LBS | OXYGEN SATURATION: 96 % | HEART RATE: 80 BPM | HEIGHT: 70 IN

## 2022-04-06 VITALS — OXYGEN SATURATION: 97 % | SYSTOLIC BLOOD PRESSURE: 83 MMHG | DIASTOLIC BLOOD PRESSURE: 57 MMHG

## 2022-04-06 DIAGNOSIS — N20.1 RIGHT URETERAL CALCULUS: Primary | ICD-10-CM

## 2022-04-06 LAB
GLUCOSE BLD-MCNC: 110 MG/DL (ref 70–99)
GLUCOSE BLD-MCNC: 114 MG/DL (ref 70–99)
PERFORMED ON: ABNORMAL
PERFORMED ON: ABNORMAL

## 2022-04-06 PROCEDURE — 2580000003 HC RX 258: Performed by: STUDENT IN AN ORGANIZED HEALTH CARE EDUCATION/TRAINING PROGRAM

## 2022-04-06 PROCEDURE — 2580000003 HC RX 258: Performed by: UROLOGY

## 2022-04-06 PROCEDURE — 7100000011 HC PHASE II RECOVERY - ADDTL 15 MIN: Performed by: UROLOGY

## 2022-04-06 PROCEDURE — 6360000002 HC RX W HCPCS: Performed by: UROLOGY

## 2022-04-06 PROCEDURE — 7100000001 HC PACU RECOVERY - ADDTL 15 MIN: Performed by: UROLOGY

## 2022-04-06 PROCEDURE — 2500000003 HC RX 250 WO HCPCS: Performed by: NURSE ANESTHETIST, CERTIFIED REGISTERED

## 2022-04-06 PROCEDURE — 3600000004 HC SURGERY LEVEL 4 BASE: Performed by: UROLOGY

## 2022-04-06 PROCEDURE — 6360000002 HC RX W HCPCS: Performed by: NURSE ANESTHETIST, CERTIFIED REGISTERED

## 2022-04-06 PROCEDURE — 2709999900 HC NON-CHARGEABLE SUPPLY: Performed by: UROLOGY

## 2022-04-06 PROCEDURE — 3600000014 HC SURGERY LEVEL 4 ADDTL 15MIN: Performed by: UROLOGY

## 2022-04-06 PROCEDURE — 50590 FRAGMENTING OF KIDNEY STONE: CPT | Performed by: UROLOGY

## 2022-04-06 PROCEDURE — 7100000000 HC PACU RECOVERY - FIRST 15 MIN: Performed by: UROLOGY

## 2022-04-06 PROCEDURE — 3700000001 HC ADD 15 MINUTES (ANESTHESIA): Performed by: UROLOGY

## 2022-04-06 PROCEDURE — 7100000010 HC PHASE II RECOVERY - FIRST 15 MIN: Performed by: UROLOGY

## 2022-04-06 PROCEDURE — 3700000000 HC ANESTHESIA ATTENDED CARE: Performed by: UROLOGY

## 2022-04-06 PROCEDURE — 74018 RADEX ABDOMEN 1 VIEW: CPT

## 2022-04-06 RX ORDER — LIDOCAINE HYDROCHLORIDE 10 MG/ML
INJECTION, SOLUTION EPIDURAL; INFILTRATION; INTRACAUDAL; PERINEURAL PRN
Status: DISCONTINUED | OUTPATIENT
Start: 2022-04-06 | End: 2022-04-06 | Stop reason: SDUPTHER

## 2022-04-06 RX ORDER — SODIUM CHLORIDE, SODIUM LACTATE, POTASSIUM CHLORIDE, CALCIUM CHLORIDE 600; 310; 30; 20 MG/100ML; MG/100ML; MG/100ML; MG/100ML
INJECTION, SOLUTION INTRAVENOUS CONTINUOUS
Status: DISCONTINUED | OUTPATIENT
Start: 2022-04-06 | End: 2022-04-06 | Stop reason: HOSPADM

## 2022-04-06 RX ORDER — FENTANYL CITRATE 50 UG/ML
25 INJECTION, SOLUTION INTRAMUSCULAR; INTRAVENOUS EVERY 5 MIN PRN
Status: DISCONTINUED | OUTPATIENT
Start: 2022-04-06 | End: 2022-04-06 | Stop reason: HOSPADM

## 2022-04-06 RX ORDER — DIPHENHYDRAMINE HYDROCHLORIDE 50 MG/ML
12.5 INJECTION INTRAMUSCULAR; INTRAVENOUS
Status: DISCONTINUED | OUTPATIENT
Start: 2022-04-06 | End: 2022-04-06 | Stop reason: HOSPADM

## 2022-04-06 RX ORDER — OXYCODONE HYDROCHLORIDE 5 MG/1
10 TABLET ORAL PRN
Status: DISCONTINUED | OUTPATIENT
Start: 2022-04-06 | End: 2022-04-06 | Stop reason: HOSPADM

## 2022-04-06 RX ORDER — MEPERIDINE HYDROCHLORIDE 25 MG/ML
12.5 INJECTION INTRAMUSCULAR; INTRAVENOUS; SUBCUTANEOUS EVERY 5 MIN PRN
Status: DISCONTINUED | OUTPATIENT
Start: 2022-04-06 | End: 2022-04-06 | Stop reason: HOSPADM

## 2022-04-06 RX ORDER — FENTANYL CITRATE 50 UG/ML
50 INJECTION, SOLUTION INTRAMUSCULAR; INTRAVENOUS EVERY 5 MIN PRN
Status: DISCONTINUED | OUTPATIENT
Start: 2022-04-06 | End: 2022-04-06 | Stop reason: HOSPADM

## 2022-04-06 RX ORDER — OXYCODONE HYDROCHLORIDE AND ACETAMINOPHEN 5; 325 MG/1; MG/1
1 TABLET ORAL EVERY 6 HOURS PRN
Qty: 20 TABLET | Refills: 0 | Status: SHIPPED | OUTPATIENT
Start: 2022-04-06 | End: 2022-04-11

## 2022-04-06 RX ORDER — ONDANSETRON 2 MG/ML
4 INJECTION INTRAMUSCULAR; INTRAVENOUS
Status: DISCONTINUED | OUTPATIENT
Start: 2022-04-06 | End: 2022-04-06 | Stop reason: HOSPADM

## 2022-04-06 RX ORDER — KETOROLAC TROMETHAMINE 30 MG/ML
INJECTION, SOLUTION INTRAMUSCULAR; INTRAVENOUS PRN
Status: DISCONTINUED | OUTPATIENT
Start: 2022-04-06 | End: 2022-04-06 | Stop reason: SDUPTHER

## 2022-04-06 RX ORDER — OXYCODONE HYDROCHLORIDE 5 MG/1
5 TABLET ORAL PRN
Status: DISCONTINUED | OUTPATIENT
Start: 2022-04-06 | End: 2022-04-06 | Stop reason: HOSPADM

## 2022-04-06 RX ORDER — DEXAMETHASONE SODIUM PHOSPHATE 10 MG/ML
INJECTION INTRAMUSCULAR; INTRAVENOUS PRN
Status: DISCONTINUED | OUTPATIENT
Start: 2022-04-06 | End: 2022-04-06 | Stop reason: SDUPTHER

## 2022-04-06 RX ORDER — SODIUM CHLORIDE 9 MG/ML
25 INJECTION, SOLUTION INTRAVENOUS PRN
Status: DISCONTINUED | OUTPATIENT
Start: 2022-04-06 | End: 2022-04-06 | Stop reason: HOSPADM

## 2022-04-06 RX ORDER — FENTANYL CITRATE 50 UG/ML
INJECTION, SOLUTION INTRAMUSCULAR; INTRAVENOUS PRN
Status: DISCONTINUED | OUTPATIENT
Start: 2022-04-06 | End: 2022-04-06 | Stop reason: SDUPTHER

## 2022-04-06 RX ORDER — SODIUM CHLORIDE 0.9 % (FLUSH) 0.9 %
5-40 SYRINGE (ML) INJECTION PRN
Status: DISCONTINUED | OUTPATIENT
Start: 2022-04-06 | End: 2022-04-06 | Stop reason: HOSPADM

## 2022-04-06 RX ORDER — LABETALOL HYDROCHLORIDE 5 MG/ML
10 INJECTION, SOLUTION INTRAVENOUS
Status: DISCONTINUED | OUTPATIENT
Start: 2022-04-06 | End: 2022-04-06 | Stop reason: HOSPADM

## 2022-04-06 RX ORDER — HYDRALAZINE HYDROCHLORIDE 20 MG/ML
10 INJECTION INTRAMUSCULAR; INTRAVENOUS
Status: DISCONTINUED | OUTPATIENT
Start: 2022-04-06 | End: 2022-04-06 | Stop reason: HOSPADM

## 2022-04-06 RX ORDER — SODIUM CHLORIDE 0.9 % (FLUSH) 0.9 %
5-40 SYRINGE (ML) INJECTION EVERY 12 HOURS SCHEDULED
Status: DISCONTINUED | OUTPATIENT
Start: 2022-04-06 | End: 2022-04-06 | Stop reason: HOSPADM

## 2022-04-06 RX ORDER — MIDAZOLAM HYDROCHLORIDE 1 MG/ML
INJECTION INTRAMUSCULAR; INTRAVENOUS PRN
Status: DISCONTINUED | OUTPATIENT
Start: 2022-04-06 | End: 2022-04-06 | Stop reason: SDUPTHER

## 2022-04-06 RX ORDER — ONDANSETRON 2 MG/ML
INJECTION INTRAMUSCULAR; INTRAVENOUS PRN
Status: DISCONTINUED | OUTPATIENT
Start: 2022-04-06 | End: 2022-04-06 | Stop reason: SDUPTHER

## 2022-04-06 RX ORDER — PROPOFOL 10 MG/ML
INJECTION, EMULSION INTRAVENOUS PRN
Status: DISCONTINUED | OUTPATIENT
Start: 2022-04-06 | End: 2022-04-06 | Stop reason: SDUPTHER

## 2022-04-06 RX ADMIN — SODIUM CHLORIDE, POTASSIUM CHLORIDE, SODIUM LACTATE AND CALCIUM CHLORIDE: 600; 310; 30; 20 INJECTION, SOLUTION INTRAVENOUS at 11:28

## 2022-04-06 RX ADMIN — DEXAMETHASONE SODIUM PHOSPHATE 10 MG: 10 INJECTION INTRAMUSCULAR; INTRAVENOUS at 11:48

## 2022-04-06 RX ADMIN — LIDOCAINE HYDROCHLORIDE 50 MG: 10 INJECTION, SOLUTION EPIDURAL; INFILTRATION; INTRACAUDAL at 11:48

## 2022-04-06 RX ADMIN — KETOROLAC TROMETHAMINE 30 MG: 30 INJECTION, SOLUTION INTRAMUSCULAR at 12:23

## 2022-04-06 RX ADMIN — PROPOFOL 200 MG: 10 INJECTION, EMULSION INTRAVENOUS at 11:48

## 2022-04-06 RX ADMIN — ONDANSETRON 4 MG: 2 INJECTION INTRAMUSCULAR; INTRAVENOUS at 11:48

## 2022-04-06 RX ADMIN — FENTANYL CITRATE 100 MCG: 50 INJECTION, SOLUTION INTRAMUSCULAR; INTRAVENOUS at 11:49

## 2022-04-06 RX ADMIN — GENTAMICIN SULFATE 120 MG: 40 INJECTION, SOLUTION INTRAMUSCULAR; INTRAVENOUS at 11:52

## 2022-04-06 RX ADMIN — SODIUM CHLORIDE, POTASSIUM CHLORIDE, SODIUM LACTATE AND CALCIUM CHLORIDE: 600; 310; 30; 20 INJECTION, SOLUTION INTRAVENOUS at 11:36

## 2022-04-06 RX ADMIN — MIDAZOLAM HYDROCHLORIDE 2 MG: 1 INJECTION, SOLUTION INTRAMUSCULAR; INTRAVENOUS at 11:43

## 2022-04-06 ASSESSMENT — PULMONARY FUNCTION TESTS
PIF_VALUE: 4
PIF_VALUE: 3
PIF_VALUE: 3
PIF_VALUE: 4
PIF_VALUE: 1
PIF_VALUE: 5
PIF_VALUE: 3
PIF_VALUE: 4
PIF_VALUE: 4
PIF_VALUE: 3
PIF_VALUE: 0
PIF_VALUE: 3
PIF_VALUE: 4
PIF_VALUE: 3
PIF_VALUE: 4
PIF_VALUE: 3
PIF_VALUE: 5
PIF_VALUE: 5
PIF_VALUE: 4
PIF_VALUE: 3
PIF_VALUE: 3
PIF_VALUE: 4
PIF_VALUE: 4
PIF_VALUE: 2
PIF_VALUE: 4
PIF_VALUE: 3
PIF_VALUE: 4
PIF_VALUE: 3
PIF_VALUE: 4
PIF_VALUE: 3
PIF_VALUE: 1
PIF_VALUE: 0
PIF_VALUE: 3

## 2022-04-06 ASSESSMENT — PAIN - FUNCTIONAL ASSESSMENT: PAIN_FUNCTIONAL_ASSESSMENT: 0-10

## 2022-04-06 NOTE — BRIEF OP NOTE
Brief Postoperative Note      Patient: Lolis Zuniga  YOB: 1970  MRN: [id number]    Date of Procedure: 4/6/2022    Pre-Op Diagnosis: RIGHT RENAL CALCULUS    Post-Op Diagnosis: Same       Procedure(s):  RIGHT   SHOCK WAVE LITHOTRIPSY    Surgeon(s):  Dania Franklin MD    Assistant:  * No surgical staff found *    Anesthesia: General    Estimated Blood Loss (mL): Minimal    Complications: None    Specimens:   * No specimens in log *    Implants:  * No implants in log *      Drains:   Urethral Catheter Non-latex 16 fr (Active)   Urine Color Cherry 03/08/22 0849       Findings: 2500 shocks at various kilovoltage with moderate fragmentation of stone  Double-J stent not removed    Electronically signed by Dania Franklin MD on 4/6/2022 at 12:48 PM

## 2022-04-06 NOTE — ANESTHESIA POSTPROCEDURE EVALUATION
Department of Anesthesiology  Postprocedure Note    Patient: Dez Mix  MRN: [id number]  YOB: 1970  Date of evaluation: 4/6/2022  Time:  12:35 PM     Procedure Summary     Date: 04/06/22 [place] / [place] [facility] [place] [address] [place] [address]    Anesthesia Start: 1140 Anesthesia Stop: 7793    Procedure: RIGHT   SHOCK WAVE LITHOTRIPSY (Right Back) Diagnosis: (RIGHT RENAL CALCULUS)    Surgeons: Ruma Saez MD Responsible Provider: Bishop Villalba MD    Anesthesia Type: general ASA Status: 2          Anesthesia Type: general    Danyell Phase I: Danyell Score: 10    Danyell Phase II:      Last vitals: Reviewed and per EMR flowsheets.        Anesthesia Post Evaluation    Patient location during evaluation: bedside  Patient participation: complete - patient participated  Level of consciousness: awake  Airway patency: patent  Nausea & Vomiting: no nausea and no vomiting  Complications: no  Cardiovascular status: blood pressure returned to baseline  Respiratory status: acceptable  Hydration status: euvolemic

## 2022-04-06 NOTE — ANESTHESIA PRE PROCEDURE
Department of Anesthesiology  Preprocedure Note       Name:  Nicole Cramer   Age:  49 y.o.  :  1970                                          MRN:  [id number]         Date:  2022      Surgeon: Katya Aguila):  Bettie See MD    Procedure: Procedure(s):  RIGHT   SHOCK WAVE LITHOTRIPSY  POSSIBLE FLEXIBLE CYSTOSCOPY WITH RIGHT STENT REMOVAL, PAT NOT NEEDED PER DR. Bev Michaud ON ARRIVAL [facility] CONFIRMATION# [id number]    Medications prior to admission:   Prior to Admission medications    Medication Sig Start Date End Date Taking? Authorizing Provider   ondansetron (ZOFRAN) 4 MG tablet Take 4 mg by mouth every 8 hours as needed for Nausea or Vomiting     Historical Provider, MD   glipiZIDE-metFORMIN (METAGLIP) 2.5-500 MG per tablet Take 1 tablet by mouth 2 times daily (before meals) 22   FANNY Chidl CNP   blood glucose monitor kit and supplies Dispense sufficient amount for indicated testing frequency plus additional to accommodate PRN testing needs. Dispense all needed supplies to include: monitor, strips, lancing device, lancets, control solutions, alcohol swabs. 21   FANNY Child CNP   blood glucose monitor strips Test 1 times a day & as needed for symptoms of irregular blood glucose. Dispense sufficient amount for indicated testing frequency plus additional to accommodate PRN testing needs. 21   FANNY Child CNP   Lancets MISC DX: diabetes mellitus. Use 1-3 time(s) daily - Ok to substitute per insurance (1 each = 1 box) 21   FANNY Child CNP       Current medications:    Current Facility-Administered Medications   Medication Dose Route Frequency Provider Last Rate Last Admin    gentamicin (GARAMYCIN) 120 mg in dextrose 5 % 100 mL IVPB  120 mg IntraVENous On Call to Marla Younger 69MD Mireya        lactated ringers infusion   IntraVENous Continuous Osvaldo Rodriguez  mL/hr at 22 1128 New Bag at 22 1128       Allergies:     Allergies Allergen Reactions    Penicillins Rash       Problem List:    Patient Active Problem List   Diagnosis Code    New onset type 2 diabetes mellitus ([facility] E11.9    Acute midline low back pain with right-sided sciatica M54.41    Hydronephrosis of right kidney N13.30    Right ureteral calculus N20.1       Past Medical History:        Diagnosis Date    Diabetes mellitus ([facility]        Past Surgical History:        Procedure Laterality Date    ANTERIOR CRUCIATE LIGAMENT REPAIR Right     and MCL    BLADDER SURGERY Right 3/8/2022    CYSTOSCOPY RIGHT DOUBLE J STENT PLACEMENT performed by Elidia Scott MD at [place] [address] History:    Social History     Tobacco Use    Smoking status: Current Every Day Smoker     Packs/day: 1.50     Years: 25.00     Pack years: 37.50     Types: Cigarettes    Smokeless tobacco: Never Used   Substance Use Topics    Alcohol use: Yes     Comment: socially                                Ready to quit: Not Answered  Counseling given: Not Answered      Vital Signs (Current):   Vitals:    04/06/22 1045   BP: 134/76   Pulse: 79   Resp: 18   Temp: 97 °F (36.1 °C)   TempSrc: Temporal   SpO2: 97%   Weight: 235 lb (106.6 kg)   Height: 5' 10\" (1.778 m)                                              BP Readings from Last 3 Encounters:   04/06/22 134/76   03/31/22 110/86   03/08/22 122/83       NPO Status: Time of last liquid consumption: 0000                        Time of last solid consumption: 0000                        Date of last liquid consumption: 04/06/22                        Date of last solid food consumption: 04/06/22    BMI:   Wt Readings from Last 3 Encounters:   04/06/22 235 lb (106.6 kg)   03/31/22 234 lb (106.1 kg)   03/08/22 234 lb (106.1 kg)     Body mass index is 33.72 kg/m².     CBC:   Lab Results   Component Value Date    WBC 12.3 03/07/2022    RBC 4.99 03/07/2022    HGB 15.5 03/07/2022    HCT 44.1 03/07/2022    MCV 88.4 03/07/2022    RDW 14.1 03/07/2022     03/07/2022       CMP:   Lab Results   Component Value Date     03/07/2022    K 4.2 03/07/2022     03/07/2022    CO2 21 03/07/2022    BUN 16 03/07/2022    CREATININE 1.11 03/07/2022    GFRAA >60.0 03/07/2022    LABGLOM >60.0 03/07/2022    GLUCOSE 133 03/07/2022    PROT 7.5 03/07/2022    CALCIUM 9.7 03/07/2022    BILITOT 0.6 03/07/2022    ALKPHOS 55 03/07/2022    AST 17 03/07/2022    ALT 17 03/07/2022       POC Tests:   Recent Labs     04/06/22  1106   POCGLU 110*       Coags:   Lab Results   Component Value Date    PROTIME 13.3 03/07/2022    INR 1.0 03/07/2022       HCG (If Applicable): No results found for: PREGTESTUR, PREGSERUM, HCG, HCGQUANT     ABGs: No results found for: PHART, PO2ART, UHI1VUF, ISZ1WCO, BEART, Q9WSJZSF     Type & Screen (If Applicable):  No results found for: LABABO, LABRH    Drug/Infectious Status (If Applicable):  No results found for: HIV, HEPCAB    COVID-19 Screening (If Applicable): No results found for: COVID19        Anesthesia Evaluation  Patient summary reviewed and Nursing notes reviewed no history of anesthetic complications:   Airway: Mallampati: II  TM distance: >3 FB   Neck ROM: full  Mouth opening: > = 3 FB Dental: normal exam         Pulmonary:Negative Pulmonary ROS and normal exam                               Cardiovascular:Negative CV ROS  Exercise tolerance: good (>4 METS),         ECG reviewed               Beta Blocker:  Not on Beta Blocker         Neuro/Psych:   Negative Neuro/Psych ROS              GI/Hepatic/Renal: Neg GI/Hepatic/Renal ROS  (+) renal disease: kidney stones,           Endo/Other:    (+) DiabetesType II DM, , .          Pt had PAT visit. Abdominal:             Vascular: negative vascular ROS. Other Findings:             Anesthesia Plan      general     ASA 2     (LMA, ETT if needed)  Induction: intravenous. MIPS: Postoperative opioids intended and Prophylactic antiemetics administered.   Anesthetic plan and risks discussed with patient. Plan discussed with CRNA.     Attending anesthesiologist reviewed and agrees with Pre Eval content              Tori Funez MD   4/6/2022

## 2022-04-06 NOTE — PROGRESS NOTES
Received from or into pacu accompanied in by [address] crna. Oral airway in place, O2 on at 8L mask, SAO2 96%, breath sounds clear to anterior auscultation. MP RSR Circular light reddened area noted right flank area.

## 2022-04-06 NOTE — OP NOTE
[address]                      [facility], [address] [facility]                                OPERATIVE REPORT    PATIENT NAME: Lizzeth Gregorio                       :        1970  MED REC NO:   [id number]                            ROOM:  ACCOUNT NO:   [de-identified]                           ADMIT DATE: 2022  PROVIDER:     Zoya Newsome MD    DATE OF PROCEDURE:  2022    PREOPERATIVE DIAGNOSIS:  Right lower pole calculus with indwelling  double-J stent. POSTOPERATIVE DIAGNOSIS:  Right lower pole calculus with indwelling  double-J stent. OPERATION PERFORMED:  Shockwave lithotripsy. SURGEON:  Oriana Siu MD    ANESTHESIA:  General.    BLOOD LOSS:  Not applicable. INDICATIONS:  The patient is a 50-year-old male with a right UPJ stone  that was treated emergently with placement of double-J stent. The  patient now comes in for shockwave lithotripsy. Today's KUB shows stone  to be near the right lower pole. Plan is to treat the stone and remove  the stent if possible. The patient understands risks and benefits  including not being able to pull the stent due to poor fragmentation of  stone. Bleeding, infection, and kidney loss are also very rare,  however, the patient is aware of these potential complications with  lithotripsy. OPERATIVE NOTE:  The patient was brought into operating room, placed on  the lithotripsy treatment table in supine position. He was then placed  under general anesthetic. The time-out was taken. Under fluoroscopy,  the stone was isolated. The lithotripsy treatment probe was then placed  underneath the right flank and 2500 shocks of various kilovoltage were  delivered with moderate fragmentation. Wu Virk A 3-minute break was taken  after the initial 300 shocks. The patient tolerated the procedure well. There were no complications. The patient will return to the office in  one week for the KUB.         KUNAL MUNIZ [facility], MD    D: 04/06/2022 13:36:01       T: 04/06/2022 13:38:57     [email or web address]  Job#: [id number]     Doc#: [id number]    CC:

## 2022-04-07 DIAGNOSIS — N20.0 KIDNEY STONE: Primary | ICD-10-CM

## 2022-04-14 ENCOUNTER — HOSPITAL ENCOUNTER (OUTPATIENT)
Dept: GENERAL RADIOLOGY | Age: 52
Discharge: HOME | End: 2022-04-16
Payer: COMMERCIAL

## 2022-04-14 ENCOUNTER — OFFICE VISIT (OUTPATIENT)
Dept: UROLOGY | Age: 52
End: 2022-04-14

## 2022-04-14 VITALS
WEIGHT: 236 LBS | SYSTOLIC BLOOD PRESSURE: 132 MMHG | BODY MASS INDEX: 33.79 KG/M2 | OXYGEN SATURATION: 96 % | DIASTOLIC BLOOD PRESSURE: 84 MMHG | HEART RATE: 93 BPM | HEIGHT: 70 IN

## 2022-04-14 DIAGNOSIS — N20.0 KIDNEY STONE: ICD-10-CM

## 2022-04-14 DIAGNOSIS — N20.0 RENAL CALCULUS, RIGHT: Primary | ICD-10-CM

## 2022-04-14 PROCEDURE — 99024 POSTOP FOLLOW-UP VISIT: CPT | Performed by: UROLOGY

## 2022-04-14 PROCEDURE — 74018 RADEX ABDOMEN 1 VIEW: CPT

## 2022-04-14 NOTE — PROGRESS NOTES
[facility] EVALUATION NOTE                                                 H&P                                                                                                                                                 Reason for Visit  Right lower pole calculus    History of Present Illness  60-year-old male who underwent shockwave lithotripsy after stent placement for a 10 mm right ureteropelvic junction stone  Minimal fragmentation of stone currently sitting in the right lower pole  Patient will be scheduled for ureteroscopy holmium laser lithotripsy and removal of stent      Urologic Review of Systems/Symptoms  Frequency from the stent    Review of Systems  Hospitalization: None recent  All 14 categories of Review of Systems otherwise reviewed no other findings reported.   No change in review of systems  Past Medical History:   Diagnosis Date    Diabetes mellitus ([address]      Past Surgical History:   Procedure Laterality Date    ANTERIOR CRUCIATE LIGAMENT REPAIR Right     and MCL    BLADDER SURGERY Right 3/8/2022    CYSTOSCOPY RIGHT DOUBLE J STENT PLACEMENT performed by Bettie See MD at [address] [place] [address] LITHOTRIPSY Right 4/6/2022    RIGHT   SHOCK WAVE LITHOTRIPSY performed by Bettie See MD at [address] [place] [address] History     Socioeconomic History    Marital status:      Spouse name: None    Number of children: None    Years of education: None    Highest education level: None   Occupational History    None   Tobacco Use    Smoking status: Current Every Day Smoker     Packs/day: 1.50     Years: 25.00     Pack years: 37.50     Types: Cigarettes    Smokeless tobacco: Never Used   Vaping Use    Vaping Use: Never used   Substance and Sexual Activity    Alcohol use: Yes     Comment: socially    Drug use: Yes     Types: Marijuana Wilber Piero)    Sexual activity: None   Other Topics Concern    None   Social History Narrative    None     Social Determinants of Health     Financial Resource Strain: Low Risk     Difficulty of Paying Living Expenses: Not hard at all   Food Insecurity: No Food Insecurity    Worried About Running Out of Food in the Last Year: Never true    Pippa of Food in the Last Year: Never true   Transportation Needs:     Lack of Transportation (Medical): Not on file    Lack of Transportation (Non-Medical): Not on file   Physical Activity:     Days of Exercise per Week: Not on file    Minutes of Exercise per Session: Not on file   Stress:     Feeling of Stress : Not on file   Social Connections:     Frequency of Communication with Friends and Family: Not on file    Frequency of Social Gatherings with Friends and Family: Not on file    Attends Islam Services: Not on file    Active Member of [address] [place] [address] [employer] or Organizations: Not on file    Attends Club or Organization Meetings: Not on file    Marital Status: Not on file   Intimate Partner Violence:     Fear of Current or Ex-Partner: Not on file    Emotionally Abused: Not on file    Physically Abused: Not on file    Sexually Abused: Not on file   Housing Stability:     Unable to Pay for Housing in the Last Year: Not on file    Number of [place] in the Last Year: Not on file    Unstable Housing in the Last Year: Not on file     Family History   Problem Relation Age of Onset    Diabetes Neg Hx      Current Outpatient Medications   Medication Sig Dispense Refill    glipiZIDE-metFORMIN (METAGLIP) 2.5-500 MG per tablet Take 1 tablet by mouth 2 times daily (before meals) 60 tablet 3    blood glucose monitor kit and supplies Dispense sufficient amount for indicated testing frequency plus additional to accommodate PRN testing needs. Dispense all needed supplies to include: monitor, strips, lancing device, lancets, control solutions, alcohol swabs. 1 kit 0    blood glucose monitor strips Test 1 times a day & as needed for symptoms of irregular blood glucose.  Dispense sufficient amount for indicated testing frequency plus additional to accommodate PRN testing needs. 100 strip 5    Lancets MISC DX: diabetes mellitus. Use 1-3 time(s) daily - Ok to substitute per insurance (1 each = 1 box) 1 each 5    ondansetron (ZOFRAN) 4 MG tablet Take 4 mg by mouth every 8 hours as needed for Nausea or Vomiting  (Patient not taking: Reported on 4/14/2022)       No current facility-administered medications for this visit. Penicillins  All reviewed and verified by Dr Nicolette Gonzalez on today's visit    PSA   Date Value Ref Range Status   09/08/2021 0.62 0.00 - 4.00 ng/mL Final     Comment:     When the Total PSA is between 3.00 and 10.00 ng/mL, consider  requesting a Free PSA to aid in diagnosis. No results found for this visit on 04/14/22. Physical Exam  Vitals:    04/14/22 0923   BP: 132/84   Pulse: 93   SpO2: 96%   Weight: 236 lb (107 kg)   Height: 5' 10\" (1.778 m)     Constitutional: Not in distress. Cardiovascular: Normal rate, BP reviewed. Unremarkable  Pulmonary/Chest: Normal respiratory effort not short of breath  Abdominal: Not distended. Urologic Exam  CT reviewed  KUB reviewed. Musculoskeletal: Ambulatory. Extremities: No edema  Neurological: Intact no deficits  Lymphatics: No lymphadenopathy  Psychiatric: Alert oriented x3 normal affect. Assessment/Medical Necessity-Decision Making  Right lower pole calculus with indwelling double-J stent minimal response to shockwave lithotripsy  Plan  Patient will be scheduled for right ureteroscopy, holmium laser lithotripsy, removal of stent,  Patient understands if this stone cannot be accessed 3 ureteroscopy that the stent will be removed and watched and perhaps treated again with lithotripsy down the road  No PAT required patient was recently hospital  Greater than 50% of 30 minutes spent consulting patient face-to-face  No orders of the defined types were placed in this encounter. No orders of the defined types were placed in this encounter.     Jason Pastrana MD Please note this report has been partially produced using speech recognition software  And may cause contain errors related to that system including grammar, punctuation and spelling as well as words and phrases that may seem inappropriate. If there are questions or concerns please feel free to contact me to clarify.

## 2022-04-27 ENCOUNTER — HOSPITAL ENCOUNTER (OUTPATIENT)
Age: 52
Setting detail: OUTPATIENT SURGERY
Discharge: HOME | End: 2022-04-27
Attending: UROLOGY | Admitting: UROLOGY
Payer: COMMERCIAL

## 2022-04-27 ENCOUNTER — APPOINTMENT (OUTPATIENT)
Dept: GENERAL RADIOLOGY | Age: 52
End: 2022-04-27
Attending: UROLOGY
Payer: COMMERCIAL

## 2022-04-27 ENCOUNTER — HOSPITAL ENCOUNTER (OUTPATIENT)
Dept: GENERAL RADIOLOGY | Age: 52
Setting detail: OUTPATIENT SURGERY
Discharge: HOME | End: 2022-04-29
Attending: UROLOGY
Payer: COMMERCIAL

## 2022-04-27 ENCOUNTER — ANESTHESIA (OUTPATIENT)
Dept: OPERATING ROOM | Age: 52
End: 2022-04-27
Payer: COMMERCIAL

## 2022-04-27 ENCOUNTER — ANESTHESIA EVENT (OUTPATIENT)
Dept: OPERATING ROOM | Age: 52
End: 2022-04-27
Payer: COMMERCIAL

## 2022-04-27 VITALS
BODY MASS INDEX: 31.5 KG/M2 | HEIGHT: 70 IN | RESPIRATION RATE: 18 BRPM | SYSTOLIC BLOOD PRESSURE: 123 MMHG | WEIGHT: 220 LBS | OXYGEN SATURATION: 98 % | DIASTOLIC BLOOD PRESSURE: 90 MMHG | HEART RATE: 63 BPM | TEMPERATURE: 96.3 F

## 2022-04-27 VITALS — SYSTOLIC BLOOD PRESSURE: 109 MMHG | DIASTOLIC BLOOD PRESSURE: 71 MMHG | TEMPERATURE: 90.1 F | OXYGEN SATURATION: 94 %

## 2022-04-27 DIAGNOSIS — N20.1 RIGHT URETERAL CALCULUS: Primary | ICD-10-CM

## 2022-04-27 DIAGNOSIS — R52 PAIN: ICD-10-CM

## 2022-04-27 LAB
ANION GAP SERPL CALCULATED.3IONS-SCNC: 14 MEQ/L (ref 9–15)
BASOPHILS ABSOLUTE: 0.1 K/UL (ref 0–0.2)
BASOPHILS RELATIVE PERCENT: 0.8 %
BUN BLDV-MCNC: 15 MG/DL (ref 6–20)
CALCIUM SERPL-MCNC: 9 MG/DL (ref 8.5–9.9)
CHLORIDE BLD-SCNC: 106 MEQ/L (ref 95–107)
CO2: 21 MEQ/L (ref 20–31)
CREAT SERPL-MCNC: 0.84 MG/DL (ref 0.7–1.2)
EOSINOPHILS ABSOLUTE: 0.4 K/UL (ref 0–0.7)
EOSINOPHILS RELATIVE PERCENT: 5.1 %
GFR AFRICAN AMERICAN: >60
GFR NON-AFRICAN AMERICAN: >60
GLUCOSE BLD-MCNC: 118 MG/DL (ref 70–99)
GLUCOSE BLD-MCNC: 127 MG/DL (ref 70–99)
GLUCOSE BLD-MCNC: 136 MG/DL (ref 70–99)
HCT VFR BLD CALC: 45 % (ref 42–52)
HEMOGLOBIN: 15.1 G/DL (ref 14–18)
LYMPHOCYTES ABSOLUTE: 1.6 K/UL (ref 1–4.8)
LYMPHOCYTES RELATIVE PERCENT: 21.7 %
MCH RBC QN AUTO: 30 PG (ref 27–31.3)
MCHC RBC AUTO-ENTMCNC: 33.6 % (ref 33–37)
MCV RBC AUTO: 89.3 FL (ref 80–100)
MONOCYTES ABSOLUTE: 0.7 K/UL (ref 0.2–0.8)
MONOCYTES RELATIVE PERCENT: 9.7 %
NEUTROPHILS ABSOLUTE: 4.5 K/UL (ref 1.4–6.5)
NEUTROPHILS RELATIVE PERCENT: 62.7 %
PDW BLD-RTO: 14 % (ref 11.5–14.5)
PERFORMED ON: ABNORMAL
PERFORMED ON: ABNORMAL
PLATELET # BLD: 199 K/UL (ref 130–400)
POTASSIUM SERPL-SCNC: 4.7 MEQ/L (ref 3.4–4.9)
RBC # BLD: 5.04 M/UL (ref 4.7–6.1)
SODIUM BLD-SCNC: 141 MEQ/L (ref 135–144)
WBC # BLD: 7.2 K/UL (ref 4.8–10.8)

## 2022-04-27 PROCEDURE — 2709999900 HC NON-CHARGEABLE SUPPLY: Performed by: UROLOGY

## 2022-04-27 PROCEDURE — 52353 CYSTOURETERO W/LITHOTRIPSY: CPT | Performed by: UROLOGY

## 2022-04-27 PROCEDURE — C1769 GUIDE WIRE: HCPCS | Performed by: UROLOGY

## 2022-04-27 PROCEDURE — 6370000000 HC RX 637 (ALT 250 FOR IP): Performed by: UROLOGY

## 2022-04-27 PROCEDURE — 6360000002 HC RX W HCPCS: Performed by: NURSE ANESTHETIST, CERTIFIED REGISTERED

## 2022-04-27 PROCEDURE — 6360000002 HC RX W HCPCS: Performed by: UROLOGY

## 2022-04-27 PROCEDURE — 7100000000 HC PACU RECOVERY - FIRST 15 MIN: Performed by: UROLOGY

## 2022-04-27 PROCEDURE — 3700000000 HC ANESTHESIA ATTENDED CARE: Performed by: UROLOGY

## 2022-04-27 PROCEDURE — 7100000001 HC PACU RECOVERY - ADDTL 15 MIN: Performed by: UROLOGY

## 2022-04-27 PROCEDURE — 80048 BASIC METABOLIC PNL TOTAL CA: CPT

## 2022-04-27 PROCEDURE — 2580000003 HC RX 258: Performed by: ANESTHESIOLOGY

## 2022-04-27 PROCEDURE — 2580000003 HC RX 258: Performed by: NURSE ANESTHETIST, CERTIFIED REGISTERED

## 2022-04-27 PROCEDURE — 2720000010 HC SURG SUPPLY STERILE: Performed by: UROLOGY

## 2022-04-27 PROCEDURE — 6360000004 HC RX CONTRAST MEDICATION: Performed by: UROLOGY

## 2022-04-27 PROCEDURE — 7100000011 HC PHASE II RECOVERY - ADDTL 15 MIN: Performed by: UROLOGY

## 2022-04-27 PROCEDURE — 76000 FLUOROSCOPY <1 HR PHYS/QHP: CPT

## 2022-04-27 PROCEDURE — 2580000003 HC RX 258: Performed by: UROLOGY

## 2022-04-27 PROCEDURE — 6370000000 HC RX 637 (ALT 250 FOR IP): Performed by: ANESTHESIOLOGY

## 2022-04-27 PROCEDURE — 74018 RADEX ABDOMEN 1 VIEW: CPT

## 2022-04-27 PROCEDURE — 3600000014 HC SURGERY LEVEL 4 ADDTL 15MIN: Performed by: UROLOGY

## 2022-04-27 PROCEDURE — 3700000001 HC ADD 15 MINUTES (ANESTHESIA): Performed by: UROLOGY

## 2022-04-27 PROCEDURE — 93005 ELECTROCARDIOGRAM TRACING: CPT | Performed by: ANESTHESIOLOGY

## 2022-04-27 PROCEDURE — C1758 CATHETER, URETERAL: HCPCS | Performed by: UROLOGY

## 2022-04-27 PROCEDURE — 3600000004 HC SURGERY LEVEL 4 BASE: Performed by: UROLOGY

## 2022-04-27 PROCEDURE — 7100000010 HC PHASE II RECOVERY - FIRST 15 MIN: Performed by: UROLOGY

## 2022-04-27 PROCEDURE — 2500000003 HC RX 250 WO HCPCS: Performed by: NURSE ANESTHETIST, CERTIFIED REGISTERED

## 2022-04-27 PROCEDURE — 85025 COMPLETE CBC W/AUTO DIFF WBC: CPT

## 2022-04-27 RX ORDER — ONDANSETRON 2 MG/ML
INJECTION INTRAMUSCULAR; INTRAVENOUS PRN
Status: DISCONTINUED | OUTPATIENT
Start: 2022-04-27 | End: 2022-04-27 | Stop reason: SDUPTHER

## 2022-04-27 RX ORDER — OXYCODONE HYDROCHLORIDE AND ACETAMINOPHEN 5; 325 MG/1; MG/1
1 TABLET ORAL EVERY 6 HOURS PRN
Qty: 20 TABLET | Refills: 0 | Status: SHIPPED | OUTPATIENT
Start: 2022-04-27 | End: 2022-05-02

## 2022-04-27 RX ORDER — FENTANYL CITRATE 50 UG/ML
INJECTION, SOLUTION INTRAMUSCULAR; INTRAVENOUS PRN
Status: DISCONTINUED | OUTPATIENT
Start: 2022-04-27 | End: 2022-04-27 | Stop reason: SDUPTHER

## 2022-04-27 RX ORDER — FENTANYL CITRATE 50 UG/ML
50 INJECTION, SOLUTION INTRAMUSCULAR; INTRAVENOUS EVERY 10 MIN PRN
Status: DISCONTINUED | OUTPATIENT
Start: 2022-04-27 | End: 2022-04-27 | Stop reason: HOSPADM

## 2022-04-27 RX ORDER — SODIUM CHLORIDE 0.9 % (FLUSH) 0.9 %
5-40 SYRINGE (ML) INJECTION EVERY 12 HOURS SCHEDULED
Status: DISCONTINUED | OUTPATIENT
Start: 2022-04-27 | End: 2022-04-27 | Stop reason: HOSPADM

## 2022-04-27 RX ORDER — TAMSULOSIN HYDROCHLORIDE 0.4 MG/1
0.4 CAPSULE ORAL DAILY
Qty: 10 CAPSULE | Refills: 0 | Status: SHIPPED | OUTPATIENT
Start: 2022-04-27 | End: 2022-05-26

## 2022-04-27 RX ORDER — DIPHENHYDRAMINE HYDROCHLORIDE 50 MG/ML
12.5 INJECTION INTRAMUSCULAR; INTRAVENOUS
Status: DISCONTINUED | OUTPATIENT
Start: 2022-04-27 | End: 2022-04-27 | Stop reason: HOSPADM

## 2022-04-27 RX ORDER — SODIUM CHLORIDE, SODIUM LACTATE, POTASSIUM CHLORIDE, CALCIUM CHLORIDE 600; 310; 30; 20 MG/100ML; MG/100ML; MG/100ML; MG/100ML
INJECTION, SOLUTION INTRAVENOUS CONTINUOUS PRN
Status: DISCONTINUED | OUTPATIENT
Start: 2022-04-27 | End: 2022-04-27 | Stop reason: SDUPTHER

## 2022-04-27 RX ORDER — MEPERIDINE HYDROCHLORIDE 25 MG/ML
12.5 INJECTION INTRAMUSCULAR; INTRAVENOUS; SUBCUTANEOUS
Status: DISCONTINUED | OUTPATIENT
Start: 2022-04-27 | End: 2022-04-27 | Stop reason: HOSPADM

## 2022-04-27 RX ORDER — MAGNESIUM HYDROXIDE 1200 MG/15ML
LIQUID ORAL PRN
Status: DISCONTINUED | OUTPATIENT
Start: 2022-04-27 | End: 2022-04-27 | Stop reason: HOSPADM

## 2022-04-27 RX ORDER — OXYCODONE HYDROCHLORIDE 5 MG/1
10 TABLET ORAL PRN
Status: COMPLETED | OUTPATIENT
Start: 2022-04-27 | End: 2022-04-27

## 2022-04-27 RX ORDER — PROPOFOL 10 MG/ML
INJECTION, EMULSION INTRAVENOUS PRN
Status: DISCONTINUED | OUTPATIENT
Start: 2022-04-27 | End: 2022-04-27 | Stop reason: SDUPTHER

## 2022-04-27 RX ORDER — SODIUM CHLORIDE, SODIUM LACTATE, POTASSIUM CHLORIDE, CALCIUM CHLORIDE 600; 310; 30; 20 MG/100ML; MG/100ML; MG/100ML; MG/100ML
INJECTION, SOLUTION INTRAVENOUS CONTINUOUS
Status: DISCONTINUED | OUTPATIENT
Start: 2022-04-27 | End: 2022-04-27 | Stop reason: HOSPADM

## 2022-04-27 RX ORDER — DEXAMETHASONE SODIUM PHOSPHATE 10 MG/ML
INJECTION INTRAMUSCULAR; INTRAVENOUS PRN
Status: DISCONTINUED | OUTPATIENT
Start: 2022-04-27 | End: 2022-04-27 | Stop reason: SDUPTHER

## 2022-04-27 RX ORDER — METOCLOPRAMIDE HYDROCHLORIDE 5 MG/ML
10 INJECTION INTRAMUSCULAR; INTRAVENOUS
Status: DISCONTINUED | OUTPATIENT
Start: 2022-04-27 | End: 2022-04-27 | Stop reason: HOSPADM

## 2022-04-27 RX ORDER — ONDANSETRON 2 MG/ML
4 INJECTION INTRAMUSCULAR; INTRAVENOUS
Status: DISCONTINUED | OUTPATIENT
Start: 2022-04-27 | End: 2022-04-27 | Stop reason: HOSPADM

## 2022-04-27 RX ORDER — LIDOCAINE HYDROCHLORIDE 20 MG/ML
INJECTION, SOLUTION EPIDURAL; INFILTRATION; INTRACAUDAL; PERINEURAL PRN
Status: DISCONTINUED | OUTPATIENT
Start: 2022-04-27 | End: 2022-04-27 | Stop reason: SDUPTHER

## 2022-04-27 RX ORDER — OXYCODONE HYDROCHLORIDE 5 MG/1
5 TABLET ORAL PRN
Status: COMPLETED | OUTPATIENT
Start: 2022-04-27 | End: 2022-04-27

## 2022-04-27 RX ORDER — SODIUM CHLORIDE 9 MG/ML
25 INJECTION, SOLUTION INTRAVENOUS PRN
Status: DISCONTINUED | OUTPATIENT
Start: 2022-04-27 | End: 2022-04-27 | Stop reason: HOSPADM

## 2022-04-27 RX ORDER — LIDOCAINE HYDROCHLORIDE 20 MG/ML
JELLY TOPICAL PRN
Status: DISCONTINUED | OUTPATIENT
Start: 2022-04-27 | End: 2022-04-27 | Stop reason: HOSPADM

## 2022-04-27 RX ORDER — KETOROLAC TROMETHAMINE 30 MG/ML
INJECTION, SOLUTION INTRAMUSCULAR; INTRAVENOUS PRN
Status: DISCONTINUED | OUTPATIENT
Start: 2022-04-27 | End: 2022-04-27 | Stop reason: SDUPTHER

## 2022-04-27 RX ORDER — SODIUM CHLORIDE 0.9 % (FLUSH) 0.9 %
5-40 SYRINGE (ML) INJECTION PRN
Status: DISCONTINUED | OUTPATIENT
Start: 2022-04-27 | End: 2022-04-27 | Stop reason: HOSPADM

## 2022-04-27 RX ORDER — MIDAZOLAM HYDROCHLORIDE 1 MG/ML
INJECTION INTRAMUSCULAR; INTRAVENOUS PRN
Status: DISCONTINUED | OUTPATIENT
Start: 2022-04-27 | End: 2022-04-27 | Stop reason: SDUPTHER

## 2022-04-27 RX ORDER — SULFAMETHOXAZOLE AND TRIMETHOPRIM 800; 160 MG/1; MG/1
1 TABLET ORAL 2 TIMES DAILY
Qty: 6 TABLET | Refills: 0 | Status: SHIPPED | OUTPATIENT
Start: 2022-04-27 | End: 2022-04-30

## 2022-04-27 RX ADMIN — OXYCODONE 10 MG: 5 TABLET ORAL at 11:32

## 2022-04-27 RX ADMIN — LIDOCAINE HYDROCHLORIDE 50 MG: 20 INJECTION, SOLUTION EPIDURAL; INFILTRATION; INTRACAUDAL; PERINEURAL at 10:08

## 2022-04-27 RX ADMIN — PROPOFOL 200 MG: 10 INJECTION, EMULSION INTRAVENOUS at 09:25

## 2022-04-27 RX ADMIN — DEXAMETHASONE SODIUM PHOSPHATE 10 MG: 10 INJECTION INTRAMUSCULAR; INTRAVENOUS at 09:24

## 2022-04-27 RX ADMIN — KETOROLAC TROMETHAMINE 30 MG: 30 INJECTION, SOLUTION INTRAMUSCULAR; INTRAVENOUS at 10:06

## 2022-04-27 RX ADMIN — GENTAMICIN SULFATE 120 MG: 40 INJECTION, SOLUTION INTRAMUSCULAR; INTRAVENOUS at 09:18

## 2022-04-27 RX ADMIN — SODIUM CHLORIDE, POTASSIUM CHLORIDE, SODIUM LACTATE AND CALCIUM CHLORIDE: 600; 310; 30; 20 INJECTION, SOLUTION INTRAVENOUS at 08:08

## 2022-04-27 RX ADMIN — MIDAZOLAM HYDROCHLORIDE 2 MG: 1 INJECTION, SOLUTION INTRAMUSCULAR; INTRAVENOUS at 09:16

## 2022-04-27 RX ADMIN — FENTANYL CITRATE 100 MCG: 50 INJECTION, SOLUTION INTRAMUSCULAR; INTRAVENOUS at 09:20

## 2022-04-27 RX ADMIN — SODIUM CHLORIDE, POTASSIUM CHLORIDE, SODIUM LACTATE AND CALCIUM CHLORIDE: 600; 310; 30; 20 INJECTION, SOLUTION INTRAVENOUS at 09:08

## 2022-04-27 RX ADMIN — ONDANSETRON 4 MG: 2 INJECTION INTRAMUSCULAR; INTRAVENOUS at 09:24

## 2022-04-27 RX ADMIN — LIDOCAINE HYDROCHLORIDE 50 MG: 20 INJECTION, SOLUTION EPIDURAL; INFILTRATION; INTRACAUDAL; PERINEURAL at 09:20

## 2022-04-27 RX ADMIN — PHENYLEPHRINE HYDROCHLORIDE 100 MCG: 10 INJECTION INTRAVENOUS at 09:56

## 2022-04-27 RX ADMIN — PROPOFOL 200 MG: 10 INJECTION, EMULSION INTRAVENOUS at 09:20

## 2022-04-27 RX ADMIN — PHENYLEPHRINE HYDROCHLORIDE 100 MCG: 10 INJECTION INTRAVENOUS at 09:52

## 2022-04-27 RX ADMIN — LIDOCAINE HYDROCHLORIDE 50 MG: 20 INJECTION, SOLUTION EPIDURAL; INFILTRATION; INTRACAUDAL; PERINEURAL at 09:47

## 2022-04-27 ASSESSMENT — PULMONARY FUNCTION TESTS
PIF_VALUE: 0
PIF_VALUE: 4
PIF_VALUE: 0
PIF_VALUE: 4
PIF_VALUE: 5
PIF_VALUE: 6
PIF_VALUE: 3
PIF_VALUE: 5
PIF_VALUE: 6
PIF_VALUE: 20
PIF_VALUE: 3
PIF_VALUE: 3
PIF_VALUE: 1
PIF_VALUE: 6
PIF_VALUE: 4
PIF_VALUE: 1
PIF_VALUE: 3
PIF_VALUE: 3
PIF_VALUE: 4
PIF_VALUE: 1
PIF_VALUE: 4
PIF_VALUE: 6
PIF_VALUE: 2
PIF_VALUE: 6
PIF_VALUE: 3
PIF_VALUE: 3
PIF_VALUE: 22
PIF_VALUE: 3
PIF_VALUE: 3
PIF_VALUE: 4
PIF_VALUE: 3
PIF_VALUE: 19
PIF_VALUE: 4
PIF_VALUE: 5
PIF_VALUE: 5
PIF_VALUE: 3
PIF_VALUE: 3
PIF_VALUE: 4
PIF_VALUE: 4
PIF_VALUE: 27
PIF_VALUE: 4
PIF_VALUE: 5
PIF_VALUE: 5
PIF_VALUE: 4
PIF_VALUE: 3
PIF_VALUE: 1
PIF_VALUE: 4
PIF_VALUE: 3
PIF_VALUE: 5
PIF_VALUE: 6
PIF_VALUE: 22
PIF_VALUE: 4

## 2022-04-27 ASSESSMENT — LIFESTYLE VARIABLES: SMOKING_STATUS: 1

## 2022-04-27 ASSESSMENT — PAIN SCALES - GENERAL: PAINLEVEL_OUTOF10: 5

## 2022-04-27 ASSESSMENT — PAIN - FUNCTIONAL ASSESSMENT: PAIN_FUNCTIONAL_ASSESSMENT: 0-10

## 2022-04-27 NOTE — ANESTHESIA PRE PROCEDURE
Department of Anesthesiology  Preprocedure Note       Name:  Everett Ceron   Age:  49 y.o.  :  1970                                          MRN:  [id number]         Date:  2022      Surgeon: Tabby Rosales):  Juarez Dobson MD    Procedure: Procedure(s): FLEXIBLE RIGHT URETEROSCOPY  HOLMIUM LASER LITHOTRIPSY    Medications prior to admission:   Prior to Admission medications    Medication Sig Start Date End Date Taking? Authorizing Provider   ondansetron (ZOFRAN) 4 MG tablet Take 4 mg by mouth every 8 hours as needed for Nausea or Vomiting   Patient not taking: Reported on 2022    Historical Provider, MD   glipiZIDE-metFORMIN (METAGLIP) 2.5-500 MG per tablet Take 1 tablet by mouth 2 times daily (before meals) 22   FANNY Pepper CNP   blood glucose monitor kit and supplies Dispense sufficient amount for indicated testing frequency plus additional to accommodate PRN testing needs. Dispense all needed supplies to include: monitor, strips, lancing device, lancets, control solutions, alcohol swabs. 21   FANNY Pepper CNP   blood glucose monitor strips Test 1 times a day & as needed for symptoms of irregular blood glucose. Dispense sufficient amount for indicated testing frequency plus additional to accommodate PRN testing needs. 21   FANNY Pepper CNP   Lancets MISC DX: diabetes mellitus. Use 1-3 time(s) daily - Ok to substitute per insurance (1 each = 1 box) 21   FANNY Pepper CNP       Current medications:    Current Facility-Administered Medications   Medication Dose Route Frequency Provider Last Rate Last Admin    gentamicin (GARAMYCIN) 120 mg in dextrose 5 % 100 mL IVPB  120 mg IntraVENous On Call to Marla Younger 69MD Mireya        lactated ringers infusion   IntraVENous Continuous Afsaneh Mclain  mL/hr at 22 0808 New Bag at 22 [phone]       Allergies:     Allergies   Allergen Reactions    Penicillins Rash       Problem List: Patient Active Problem List   Diagnosis Code    New onset type 2 diabetes mellitus ([facility] [address] E11.9    Acute midline low back pain with right-sided sciatica M54.41    Hydronephrosis of right kidney N13.30    Right ureteral calculus N20.1       Past Medical History:        Diagnosis Date    Diabetes mellitus ([facility] [address]        Past Surgical History:        Procedure Laterality Date    ANTERIOR CRUCIATE LIGAMENT REPAIR Right     and MCL    BLADDER SURGERY Right 3/8/2022    CYSTOSCOPY RIGHT DOUBLE J STENT PLACEMENT performed by Amy Dumont MD at [address] [place] [address] LITHOTRIPSY Right 4/6/2022    RIGHT   SHOCK WAVE LITHOTRIPSY performed by Amy Dumont MD at [place] [address] History:    Social History     Tobacco Use    Smoking status: Current Every Day Smoker     Packs/day: 1.50     Years: 25.00     Pack years: 37.50     Types: Cigarettes    Smokeless tobacco: Never Used   Substance Use Topics    Alcohol use: Yes     Comment: socially                                Ready to quit: Not Answered  Counseling given: Not Answered      Vital Signs (Current):   Vitals:    04/27/22 0741   BP: 125/85   Pulse: 77   Resp: 18   Temp: 97 °F (36.1 °C)   TempSrc: Temporal   SpO2: 96%   Weight: 220 lb (99.8 kg)   Height: 5' 10\" (1.778 m)                                              BP Readings from Last 3 Encounters:   04/27/22 125/85   04/14/22 132/84   04/06/22 109/72       NPO Status: Time of last liquid consumption: 2300                        Time of last solid consumption: 2100                        Date of last liquid consumption: 04/26/22                        Date of last solid food consumption: 04/26/22    BMI:   Wt Readings from Last 3 Encounters:   04/27/22 220 lb (99.8 kg)   04/14/22 236 lb (107 kg)   04/06/22 235 lb (106.6 kg)     Body mass index is 31.57 kg/m².     CBC:   Lab Results   Component Value Date    WBC 7.2 04/27/2022    RBC 5.04 04/27/2022    HGB 15.1 04/27/2022    HCT 45.0 04/27/2022    MCV 89.3 04/27/2022    RDW 14.0 04/27/2022     04/27/2022       CMP:   Lab Results   Component Value Date     04/27/2022    K 4.7 04/27/2022     04/27/2022    CO2 21 04/27/2022    BUN 15 04/27/2022    CREATININE 0.84 04/27/2022    GFRAA >60.0 04/27/2022    LABGLOM >60.0 04/27/2022    GLUCOSE 118 04/27/2022    PROT 7.5 03/07/2022    CALCIUM 9.0 04/27/2022    BILITOT 0.6 03/07/2022    ALKPHOS 55 03/07/2022    AST 17 03/07/2022    ALT 17 03/07/2022       POC Tests:   Recent Labs     04/27/22  0757   POCGLU 127*       Coags:   Lab Results   Component Value Date    PROTIME 13.3 03/07/2022    INR 1.0 03/07/2022       HCG (If Applicable): No results found for: PREGTESTUR, PREGSERUM, HCG, HCGQUANT     ABGs: No results found for: PHART, PO2ART, QCG7GZD, MDT4EEZ, BEART, F3NLBZFV     Type & Screen (If Applicable):  No results found for: LABABO, LABRH    Drug/Infectious Status (If Applicable):  No results found for: HIV, HEPCAB    COVID-19 Screening (If Applicable): No results found for: COVID19        Anesthesia Evaluation    Airway: Mallampati: II  TM distance: >3 FB   Neck ROM: full  Mouth opening: > = 3 FB Dental: normal exam         Pulmonary: breath sounds clear to auscultation  (+) current smoker          Patient smoked on day of surgery. Cardiovascular:Negative CV ROS            Rhythm: regular                      Neuro/Psych:   (+) neuromuscular disease:,             GI/Hepatic/Renal:   (+) renal disease: kidney stones,           Endo/Other:    (+) DiabetesType II DM, , .                 Abdominal:             Vascular: negative vascular ROS. Other Findings:             Anesthesia Plan      general     ASA 2       Induction: intravenous. MIPS: Postoperative opioids intended and Prophylactic antiemetics administered. Anesthetic plan and risks discussed with patient. Plan discussed with CRNA.     Attending anesthesiologist reviewed and agrees with Preprocedure content              Maria Guadalupe Ferrara MD   4/27/2022

## 2022-04-27 NOTE — BRIEF OP NOTE
Brief Postoperative Note      Patient: Alvaro Brand  YOB: 1970  MRN: [id number]    Date of Procedure: 4/27/2022    Pre-Op Diagnosis: RIGHT RENAL CALCULUS    Post-Op Diagnosis: Same       Procedure(s):   FLEXIBLE RIGHT URETEROSCOPY, RIGHT DOUBLE J STENT REMOVED  HOLMIUM LASER LITHOTRIPSY    Surgeon(s):  Vickey López MD    Assistant:  * No surgical staff found *    Anesthesia: General    Estimated Blood Loss (mL): Minimal    Complications: None    Specimens:   * No specimens in log *    Implants:  * No implants in log *      Drains:   Urethral Catheter Non-latex 16 fr (Active)       Findings: stone dusted stent left out    Electronically signed by Vickey López MD on 4/27/2022 at 10:21 AM

## 2022-04-27 NOTE — PROGRESS NOTES
Patient received from surgery, no complaints of pain. Strong Urgency to urinate, urinated 100 ml bloody.  Report given to [facility]

## 2022-04-27 NOTE — OP NOTE
[address]                      [facility], [address] [facility]                                OPERATIVE REPORT    PATIENT NAME: Harsh Morris                         :        1970  MED REC NO:   [id number]                            ROOM:  ACCOUNT NO:   [de-identified]                           ADMIT DATE: 2022  PROVIDER:     Ida Wetzel MD    DATE OF PROCEDURE:  2022    PREOPERATIVE DIAGNOSIS:  Indwelling double-J stent with right lower pole  calculus. POSTOPERATIVE DIAGNOSIS:  Indwelling double-J stent with right lower  pole calculus. OPERATIONS PERFORMED:  Cystoscopy, removal of double-J stent, right  ureteroscopy with holmium laser fragmentation of lower pole calculus. SURGEON:  Avni Ascencio. Malik Espinoza MD    ANESTHESIA:  General.    ESTIMATED BLOOD LOSS:  Not applicable. INDICATIONS:  The patient is a 64-year-old male who had an emergent  double-J stent placed for a 9-mm UPJ stone approximately four weeks ago. He was treated with shockwave lithotripsy thereafter. At the time of  the shockwave lithotripsy, the patient's stone was noted to migrate into  right lower pole. There was minimal fragmentation at the time of the  lithotripsy. At this time, the KUB today shows that there are two  larger fragments within the right lower pole. Our plan is to go up with  the ureteroscope and to dust and treat the stone into small pieces and  remove the stent. The patient understands risks and benefits including  not be able to access the stone with the ureteroscope, potential renal  colic, if the stone is treated while passing small fragments. OPERATIVE NOTE:  The patient was taken to the operating room and placed  on the operating room table in the dorsal lithotomy position after  initiating general anesthetic. He was prepped and draped in sterile  fashion. Time-out was taken. A 21-Italian cystoscope was used to  perform a cystoscopy.   Cystoscopic evaluation showed no new findings in  the bladder. Then, the right ureter was cannulated and a guidewire was  placed in the right collecting system. The stent was then removed. Then, a 6-Swedish LithoVue ureteroscope was placed under direct vision  into the right renal pelvis. There was significant amount of torque and  turn that be done almost to 180 degrees in order to access the calyx  with a small calculi. The  calculi were then noted and treated  with holmium laser into fragments about the size of the laser filament less  than 1 mm each piece. There was minimal bleeding or irritation of the  ureter. Examination of ureter on the way back showed no evidence of  perforation and/or injury and/or other fragments. The patient's bladder  was then drained. He was discharged to recovery room in stable  condition. The patient tolerated this procedure well. There were no  complications.         Antonino Culp MD    D: 04/27/2022 11:10:39       T: 04/27/2022 11:12:58     [email or web address]  Job#: [id number]     Doc#: [id number]    CC:

## 2022-04-27 NOTE — ANESTHESIA POSTPROCEDURE EVALUATION
Department of Anesthesiology  Postprocedure Note    Patient: Sung Jones  MRN: [id number]  YOB: 1970  Date of evaluation: 4/27/2022  Time:  10:20 AM     Procedure Summary     Date: 04/27/22 Room / Location: [facility]    Anesthesia Start: 0232 Anesthesia Stop: 1639    Procedures: FLEXIBLE RIGHT URETEROSCOPY, RIGHT DOUBLE J STENT REMOVED (Right )      HOLMIUM LASER LITHOTRIPSY (N/A ) Diagnosis: (RIGHT RENAL CALCULUS)    Surgeons: Kandace Almeida MD Responsible Provider: Richelle Gold MD    Anesthesia Type: general ASA Status: 2          Anesthesia Type: general    Danyell Phase I:      Danyell Phase II:      Last vitals: Reviewed and per EMR flowsheets.        Anesthesia Post Evaluation    Patient location during evaluation: bedside  Patient participation: complete - patient participated  Level of consciousness: awake  Airway patency: patent  Nausea & Vomiting: no nausea and no vomiting  Complications: no  Cardiovascular status: blood pressure returned to baseline  Respiratory status: acceptable  Hydration status: euvolemic

## 2022-04-28 DIAGNOSIS — N20.0 KIDNEY STONE: Primary | ICD-10-CM

## 2022-04-28 NOTE — OP NOTE
[address]                      [address], [address] [facility]                                OPERATIVE REPORT    PATIENT NAME: Liyah Mancilla                       :        1970  MED REC NO:   [id number]                            ROOM:  ACCOUNT NO:   [de-identified]                           ADMIT DATE: 2022  PROVIDER:     Penny Conklin MD    CORRECTED FOR CSN (22 leila)    DATE OF PROCEDURE:  2022    PREOPERATIVE DIAGNOSIS:  Indwelling double-J stent with right lower pole  calculus. POSTOPERATIVE DIAGNOSIS:  Indwelling double-J stent with right lower  pole calculus. OPERATIONS PERFORMED:  Cystoscopy, removal of double-J stent, right  ureteroscopy with holmium laser fragmentation of lower pole calculus. SURGEON:  Bimal Bautista. Aniceto Hyman MD    ANESTHESIA:  General.    ESTIMATED BLOOD LOSS:  Not applicable. INDICATIONS:  The patient is a 29-year-old male who had an emergent  double-J stent placed for a 9-mm UPJ stone approximately four weeks ago. He was treated with shockwave lithotripsy thereafter. At the time of  the shockwave lithotripsy, the patient's stone was noted to migrate into  right lower pole. There was minimal fragmentation at the time of the  lithotripsy. At this time, the KUB today shows that there are two  larger fragments within the right lower pole. Our plan is to go up with  the ureteroscope and to dust and treat the stone into small pieces and  remove the stent. The patient understands risks and benefits including  not be able to access the stone with the ureteroscope, potential renal  colic, if the stone is treated while passing small fragments. OPERATIVE NOTE:  The patient was taken to the operating room and placed  on the operating room table in the dorsal lithotomy position after  initiating general anesthetic. He was prepped and draped in sterile  fashion. Time-out was taken.   A 21-Cambodian cystoscope was used to  perform a cystoscopy. Cystoscopic evaluation showed no new findings in  the bladder. Then, the right ureter was cannulated and a guidewire was  placed in the right collecting system. The stent was then removed. Then, a 6-Bengali LithoVue ureteroscope was placed under direct vision  into the right renal pelvis. There was significant amount of torque and  turn that be done almost to 180 degrees in order to access the calyx  with a small calculi. The stone calculi were then noted and treated  with holmium laser into fragments about the size of the filament less  than 1 mm each piece. There was minimal bleeding or irritation of the  ureter. Examination of ureter on the way back showed no evidence of  perforation and/or injury and/or other fragments. The patient's bladder  was then drained. He was discharged to recovery room in stable  condition. The patient tolerated this procedure well. There were no  complications.         Susan Marlow MD    D: 04/27/2022 11:10:39       T: 04/27/2022 11:12:58     [email or web address]  Job#: [id number]     Doc#: [id number]    CC:

## 2022-04-30 LAB
EKG ATRIAL RATE: 73 BPM
EKG P AXIS: 27 DEGREES
EKG P-R INTERVAL: 132 MS
EKG Q-T INTERVAL: 378 MS
EKG QRS DURATION: 94 MS
EKG QTC CALCULATION (BAZETT): 416 MS
EKG R AXIS: -17 DEGREES
EKG T AXIS: 14 DEGREES
EKG VENTRICULAR RATE: 73 BPM

## 2022-04-30 PROCEDURE — 93010 ELECTROCARDIOGRAM REPORT: CPT | Performed by: INTERNAL MEDICINE

## 2022-05-25 ENCOUNTER — HOSPITAL ENCOUNTER (OUTPATIENT)
Dept: GENERAL RADIOLOGY | Age: 52
Discharge: HOME | End: 2022-05-27
Payer: COMMERCIAL

## 2022-05-25 DIAGNOSIS — N20.0 KIDNEY STONE: ICD-10-CM

## 2022-05-25 PROCEDURE — 74018 RADEX ABDOMEN 1 VIEW: CPT

## 2022-05-26 ENCOUNTER — SCHEDULED TELEPHONE ENCOUNTER (OUTPATIENT)
Dept: UROLOGY | Age: 52
End: 2022-05-26

## 2022-05-26 DIAGNOSIS — N20.1 RIGHT URETERAL CALCULUS: Primary | ICD-10-CM

## 2022-05-26 PROCEDURE — 99024 POSTOP FOLLOW-UP VISIT: CPT | Performed by: UROLOGY

## 2022-05-26 NOTE — PROGRESS NOTES
Urology  Status post ureteroscopy holmium laser lithotripsy removal of stent  KUB from yesterday shows no evidence of residual calculi  Virtual visit postoperative  Patient currently asymptomatic  Has dietary restriction diet  Normal PSA  Patient will follow up on a as needed basis  Greater than 5 less than 10-minute postop visit  Raphael Nash MD

## 2022-06-01 ENCOUNTER — OFFICE VISIT (OUTPATIENT)
Dept: FAMILY MEDICINE CLINIC | Age: 52
End: 2022-06-01
Payer: COMMERCIAL

## 2022-06-01 VITALS
SYSTOLIC BLOOD PRESSURE: 124 MMHG | HEIGHT: 70 IN | DIASTOLIC BLOOD PRESSURE: 68 MMHG | OXYGEN SATURATION: 96 % | WEIGHT: 236.2 LBS | HEART RATE: 71 BPM | TEMPERATURE: 97.4 F | BODY MASS INDEX: 33.82 KG/M2

## 2022-06-01 DIAGNOSIS — Z87.891 PERSONAL HISTORY OF TOBACCO USE: ICD-10-CM

## 2022-06-01 DIAGNOSIS — E11.9 TYPE 2 DIABETES MELLITUS WITHOUT COMPLICATION, WITHOUT LONG-TERM CURRENT USE OF INSULIN (HCC): Primary | ICD-10-CM

## 2022-06-01 DIAGNOSIS — Z12.11 SCREEN FOR COLON CANCER: ICD-10-CM

## 2022-06-01 LAB — HBA1C MFR BLD: 6.1 %

## 2022-06-01 PROCEDURE — 4004F PT TOBACCO SCREEN RCVD TLK: CPT | Performed by: NURSE PRACTITIONER

## 2022-06-01 PROCEDURE — 3044F HG A1C LEVEL LT 7.0%: CPT | Performed by: NURSE PRACTITIONER

## 2022-06-01 PROCEDURE — G8427 DOCREV CUR MEDS BY ELIG CLIN: HCPCS | Performed by: NURSE PRACTITIONER

## 2022-06-01 PROCEDURE — 3017F COLORECTAL CA SCREEN DOC REV: CPT | Performed by: NURSE PRACTITIONER

## 2022-06-01 PROCEDURE — G0296 VISIT TO DETERM LDCT ELIG: HCPCS | Performed by: NURSE PRACTITIONER

## 2022-06-01 PROCEDURE — 83036 HEMOGLOBIN GLYCOSYLATED A1C: CPT | Performed by: NURSE PRACTITIONER

## 2022-06-01 PROCEDURE — 99213 OFFICE O/P EST LOW 20 MIN: CPT | Performed by: NURSE PRACTITIONER

## 2022-06-01 PROCEDURE — G8417 CALC BMI ABV UP PARAM F/U: HCPCS | Performed by: NURSE PRACTITIONER

## 2022-06-01 PROCEDURE — 2022F DILAT RTA XM EVC RTNOPTHY: CPT | Performed by: NURSE PRACTITIONER

## 2022-06-01 ASSESSMENT — ENCOUNTER SYMPTOMS
GASTROINTESTINAL NEGATIVE: 1
COUGH: 0
BACK PAIN: 0
WHEEZING: 0
EYES NEGATIVE: 1
SHORTNESS OF BREATH: 0

## 2022-06-01 NOTE — PROGRESS NOTES
Subjective:     Patient ID: Siva Trujillo is a 49 y.o. male who presentstoday for:  Chief Complaint   Patient presents with    Follow-up     pt here for f/u       Diabetes  He presents for his follow-up diabetic visit. He has type 2 diabetes mellitus. His disease course has been improving. There are no hypoglycemic associated symptoms. Pertinent negatives for hypoglycemia include no dizziness or headaches. There are no diabetic associated symptoms. Pertinent negatives for diabetes include no chest pain, no fatigue, no polydipsia, no polyphagia, no polyuria and no weakness. There are no hypoglycemic complications. Risk factors for coronary artery disease include male sex and obesity. Current diabetic treatment includes oral agent (dual therapy) and diet. He is compliant with treatment all of the time. His weight is stable. Meal planning includes avoidance of concentrated sweets. He monitors blood glucose at home 1-2 x per day. Blood glucose monitoring compliance is excellent. His home blood glucose trend is decreasing steadily. His overall blood glucose range is  mg/dl. Past Medical History:   Diagnosis Date    Diabetes mellitus ([facility] [address]      Current Outpatient Medications on File Prior to Visit   Medication Sig Dispense Refill    glipiZIDE-metFORMIN (METAGLIP) 2.5-500 MG per tablet Take 1 tablet by mouth 2 times daily (before meals) 60 tablet 3    blood glucose monitor kit and supplies Dispense sufficient amount for indicated testing frequency plus additional to accommodate PRN testing needs. Dispense all needed supplies to include: monitor, strips, lancing device, lancets, control solutions, alcohol swabs. 1 kit 0    blood glucose monitor strips Test 1 times a day & as needed for symptoms of irregular blood glucose. Dispense sufficient amount for indicated testing frequency plus additional to accommodate PRN testing needs. 100 strip 5    Lancets MISC DX: diabetes mellitus.  Use 1-3 time(s) daily - Ok to substitute per insurance (1 each = 1 box) 1 each 5     No current facility-administered medications on file prior to visit. Past Surgical History:   Procedure Laterality Date    ANTERIOR CRUCIATE LIGAMENT REPAIR Right     and MCL    BLADDER SURGERY Right 3/8/2022    CYSTOSCOPY RIGHT DOUBLE J STENT PLACEMENT performed by Pedro Sal MD at [address] [place] [address] LITHOTRIPSY Right 4/6/2022    RIGHT   SHOCK WAVE LITHOTRIPSY performed by Pedro Sal MD at [address] [place] [address] LITHOTRIPSY N/A 4/27/2022    HOLMIUM LASER LITHOTRIPSY performed by Pedro Sal MD at [facility] Right 4/27/2022    FLEXIBLE RIGHT URETEROSCOPY, RIGHT DOUBLE J STENT REMOVED performed by Pedro Sal MD at [address] [place] [address] History   Problem Relation Age of Onset    Diabetes Neg Hx      Social History     Socioeconomic History    Marital status:      Spouse name: Not on file    Number of children: Not on file    Years of education: Not on file    Highest education level: Not on file   Occupational History    Not on file   Tobacco Use    Smoking status: Current Every Day Smoker     Packs/day: 1.50     Years: 25.00     Pack years: 37.50     Types: Cigarettes    Smokeless tobacco: Never Used   Vaping Use    Vaping Use: Never used   Substance and Sexual Activity    Alcohol use: Yes     Comment: socially    Drug use: Yes     Types: Marijuana Charlaine Sukhdev)    Sexual activity: Not on file   Other Topics Concern    Not on file   Social History Narrative    Not on file     Social Determinants of Health     Financial Resource Strain: Low Risk     Difficulty of Paying Living Expenses: Not hard at all   Food Insecurity: No Food Insecurity    Worried About [facility] in the Last Year: Never true    [place] of Food in the Last Year: Never true   Transportation Needs:     Lack of Transportation (Medical): Not on file    Lack of Transportation (Non-Medical):  Not on file   Physical Activity:     Days of Exercise per Week: Not on file    Minutes of Exercise per Session: Not on file   Stress:     Feeling of Stress : Not on file   Social Connections:     Frequency of Communication with Friends and Family: Not on file    Frequency of Social Gatherings with Friends and Family: Not on file    Attends Jehovah's witness Services: Not on file    Active Member of [address] [place] [address] or Organizations: Not on file    Attends Club or Organization Meetings: Not on file    Marital Status: Not on file   Intimate Partner Violence:     Fear of Current or Ex-Partner: Not on file    Emotionally Abused: Not on file    Physically Abused: Not on file    Sexually Abused: Not on file   Housing Stability:     Unable to Pay for Housing in the Last Year: Not on file    Number of [place] in the Last Year: Not on file    Unstable Housing in the Last Year: Not on file     Allergies:  Penicillins    Review of Systems   Constitutional: Negative for activity change, appetite change, chills, diaphoresis, fatigue, fever and unexpected weight change. HENT: Negative. Eyes: Negative. Respiratory: Negative for cough, shortness of breath and wheezing. Cardiovascular: Negative for chest pain, palpitations and leg swelling. Gastrointestinal: Negative. Endocrine: Negative for polydipsia, polyphagia and polyuria. Genitourinary: Negative. Musculoskeletal: Negative for back pain, gait problem and joint swelling. Skin: Negative. Neurological: Negative for dizziness, syncope, weakness, light-headedness and headaches. Psychiatric/Behavioral: Negative. Objective:    /68   Pulse 71   Temp 97.4 °F (36.3 °C) (Temporal)   Ht 5' 10\" (1.778 m)   Wt 236 lb 3.2 oz (107.1 kg)   SpO2 96%   BMI 33.89 kg/m²     Physical Exam  Constitutional:       General: He is not in acute distress. HENT:      Head: Normocephalic and atraumatic.       Mouth/Throat:      Mouth: Mucous membranes are moist.   Eyes:      Conjunctiva/sclera: Conjunctivae normal.   Cardiovascular:      Rate and Rhythm: Normal rate and regular rhythm. Heart sounds: Normal heart sounds. Pulmonary:      Effort: Pulmonary effort is normal. No respiratory distress. Breath sounds: Normal breath sounds. Musculoskeletal:      Cervical back: Neck supple. Right lower leg: No edema. Left lower leg: No edema. Skin:     General: Skin is warm and dry. Neurological:      General: No focal deficit present. Mental Status: He is alert and oriented to person, place, and time. Psychiatric:         Mood and Affect: Mood normal.         Behavior: Behavior normal.         Assessment & Plan:       Diagnosis Orders   1. Type 2 diabetes mellitus without complication, without long-term current use of insulin (HCC)  POCT glycosylated hemoglobin (Hb A1C)   2. Personal history of tobacco use  GA VISIT TO DISCUSS LUNG CA SCREEN W LDCT    CT Lung Screen (Annual)   3. Screen for colon cancer  Ambulatory referral to Gastroenterology     Orders Placed This Encounter   Procedures    CT Lung Screen (Annual)     Age: Patient is 49 y.o. Smoking History: Social History    Tobacco Use      Smoking status: Current Every Day Smoker        Packs/day: 1.50        Years: 25.00        Pack years: 37.5        Types: Cigarettes      Smokeless tobacco: Never Used    Vaping Use      Vaping Use: Never used    Alcohol use: Yes      Comment: socially    Drug use: Yes      Types: Marijuana (Weed)   Pack years: 37.5    Date of last lung cancer screening: No previous lung cancer screening exam     Standing Status:   Future     Standing Expiration Date:   12/1/2023     Order Specific Question:   Is there documentation of shared decision making? Answer:   Yes     Order Specific Question:   Is this a low dose CT or a routine CT?      Answer:   Low Dose CT [1]     Order Specific Question:   Is this the first (baseline) CT or an annual exam?     Answer:   Baseline [1]     Order Specific Question:   Does the patient show any signs or symptoms of lung cancer? Answer:   No     Order Specific Question:   Smoking Status? Answer:   Current Every Day Smoker [1]     Order Specific Question:   Smoking packs per day? Answer:   1.5     Order Specific Question:   Years smoking? Answer:   22    Ambulatory referral to Gastroenterology     Referral Priority:   Routine     Referral Type:   Eval and Treat     Referral Reason:   Specialty Services Required     Requested Specialty:   Gastroenterology     Number of Visits Requested:   1    POCT glycosylated hemoglobin (Hb A1C)    ME VISIT TO DISCUSS LUNG CA SCREEN W LDCT     No orders of the defined types were placed in this encounter. Medications Discontinued During This Encounter   Medication Reason    ondansetron (ZOFRAN) 4 MG tablet      Return in about 6 months (around 12/1/2022). Reviewed with the patient: currentclinical status, medications, activities and diet. Side effects, adverse effects of the medicationprescribed today, as well as treatment plan/ rationale and result expectations havebeen discussed with the patient who expresses understanding and desires to proceed. Pt instructions reviewed and given to patient.     Close follow up to evaluate treatment resultsand for coordination of care. I have reviewed the patient's medical historyin detail and updated the computerized patient record. Farideh Rincon, APRN - CNP                Low Dose CT (LDCT) Lung Screening criteria met:     Age 50-77(Medicare) or 50-80 (USPSTF)   Pack year smoking >20   Still smoking or less than 15 year since quit   No sign or symptoms of lung cancer   > 11 months since last LDCT     Risks and benefits of lung cancer screening with LDCT scans discussed:    Significance of positive screen - False-positive LDCT results often occur. 95% of all positive results do not lead to a diagnosis of cancer.  Usually further imaging can resolve most false-positive results; however, some patients may require invasive procedures. Over diagnosis risk - 10% to 12% of screen-detected lung cancer cases are over diagnosed--that is, the cancer would not have been detected in the patient's lifetime without the screening. Need for follow up screens annually to continue lung cancer screening effectiveness     Risks associated with radiation from annual LDCT- Radiation exposure is about the same as for a mammogram, which is about 1/3 of the annual background radiation exposure from everyday life. Starting screening at age 54 is not likely to increase cancer risk from radiation exposure. Patients with comorbidities resulting in life expectancy of < 10 years, or that would preclude treatment of an abnormality identified on CT, should not be screened due to lack of benefit.     To obtain maximal benefit from this screening, smoking cessation and long-term abstinence from smoking is critical

## 2022-07-25 ENCOUNTER — HOSPITAL ENCOUNTER (OUTPATIENT)
Age: 52
Setting detail: OUTPATIENT SURGERY
Discharge: HOME | End: 2022-07-25
Attending: SPECIALIST | Admitting: SPECIALIST
Payer: COMMERCIAL

## 2022-07-25 ENCOUNTER — ANESTHESIA EVENT (OUTPATIENT)
Dept: ENDOSCOPY | Age: 52
End: 2022-07-25
Payer: COMMERCIAL

## 2022-07-25 ENCOUNTER — ANESTHESIA (OUTPATIENT)
Dept: ENDOSCOPY | Age: 52
End: 2022-07-25
Payer: COMMERCIAL

## 2022-07-25 VITALS
SYSTOLIC BLOOD PRESSURE: 124 MMHG | OXYGEN SATURATION: 96 % | HEART RATE: 69 BPM | TEMPERATURE: 97.3 F | RESPIRATION RATE: 18 BRPM | HEIGHT: 70 IN | DIASTOLIC BLOOD PRESSURE: 85 MMHG | BODY MASS INDEX: 33.36 KG/M2 | WEIGHT: 233 LBS

## 2022-07-25 DIAGNOSIS — Z12.11 COLON CANCER SCREENING: ICD-10-CM

## 2022-07-25 LAB
GLUCOSE BLD-MCNC: 128 MG/DL (ref 70–99)
PERFORMED ON: ABNORMAL

## 2022-07-25 PROCEDURE — 6360000002 HC RX W HCPCS: Performed by: NURSE ANESTHETIST, CERTIFIED REGISTERED

## 2022-07-25 PROCEDURE — 3700000000 HC ANESTHESIA ATTENDED CARE: Performed by: SPECIALIST

## 2022-07-25 PROCEDURE — 2580000003 HC RX 258: Performed by: SPECIALIST

## 2022-07-25 PROCEDURE — 45380 COLONOSCOPY AND BIOPSY: CPT | Performed by: SPECIALIST

## 2022-07-25 PROCEDURE — 2580000003 HC RX 258

## 2022-07-25 PROCEDURE — 7100000011 HC PHASE II RECOVERY - ADDTL 15 MIN: Performed by: SPECIALIST

## 2022-07-25 PROCEDURE — 2709999900 HC NON-CHARGEABLE SUPPLY: Performed by: SPECIALIST

## 2022-07-25 PROCEDURE — 88305 TISSUE EXAM BY PATHOLOGIST: CPT

## 2022-07-25 PROCEDURE — 2500000003 HC RX 250 WO HCPCS: Performed by: NURSE ANESTHETIST, CERTIFIED REGISTERED

## 2022-07-25 PROCEDURE — 3700000001 HC ADD 15 MINUTES (ANESTHESIA): Performed by: SPECIALIST

## 2022-07-25 PROCEDURE — 6370000000 HC RX 637 (ALT 250 FOR IP): Performed by: SPECIALIST

## 2022-07-25 PROCEDURE — 7100000010 HC PHASE II RECOVERY - FIRST 15 MIN: Performed by: SPECIALIST

## 2022-07-25 PROCEDURE — 45385 COLONOSCOPY W/LESION REMOVAL: CPT | Performed by: SPECIALIST

## 2022-07-25 PROCEDURE — 3609027000 HC COLONOSCOPY: Performed by: SPECIALIST

## 2022-07-25 RX ORDER — PROPOFOL 10 MG/ML
INJECTION, EMULSION INTRAVENOUS PRN
Status: DISCONTINUED | OUTPATIENT
Start: 2022-07-25 | End: 2022-07-25 | Stop reason: SDUPTHER

## 2022-07-25 RX ORDER — SODIUM CHLORIDE 9 MG/ML
INJECTION, SOLUTION INTRAVENOUS
Status: COMPLETED
Start: 2022-07-25 | End: 2022-07-25

## 2022-07-25 RX ORDER — SODIUM CHLORIDE 9 MG/ML
INJECTION, SOLUTION INTRAVENOUS CONTINUOUS
Status: DISCONTINUED | OUTPATIENT
Start: 2022-07-25 | End: 2022-07-25 | Stop reason: HOSPADM

## 2022-07-25 RX ORDER — SIMETHICONE 20 MG/.3ML
EMULSION ORAL PRN
Status: DISCONTINUED | OUTPATIENT
Start: 2022-07-25 | End: 2022-07-25 | Stop reason: ALTCHOICE

## 2022-07-25 RX ORDER — LIDOCAINE HYDROCHLORIDE 20 MG/ML
INJECTION, SOLUTION EPIDURAL; INFILTRATION; INTRACAUDAL; PERINEURAL PRN
Status: DISCONTINUED | OUTPATIENT
Start: 2022-07-25 | End: 2022-07-25 | Stop reason: SDUPTHER

## 2022-07-25 RX ORDER — MAGNESIUM HYDROXIDE 1200 MG/15ML
LIQUID ORAL PRN
Status: DISCONTINUED | OUTPATIENT
Start: 2022-07-25 | End: 2022-07-25 | Stop reason: ALTCHOICE

## 2022-07-25 RX ADMIN — PROPOFOL 80 MG: 10 INJECTION, EMULSION INTRAVENOUS at 10:39

## 2022-07-25 RX ADMIN — LIDOCAINE HYDROCHLORIDE 60 MG: 20 INJECTION, SOLUTION EPIDURAL; INFILTRATION; INTRACAUDAL; PERINEURAL at 10:20

## 2022-07-25 RX ADMIN — SODIUM CHLORIDE: 9 INJECTION, SOLUTION INTRAVENOUS at 09:39

## 2022-07-25 RX ADMIN — PROPOFOL 100 MG: 10 INJECTION, EMULSION INTRAVENOUS at 10:35

## 2022-07-25 RX ADMIN — PROPOFOL 100 MG: 10 INJECTION, EMULSION INTRAVENOUS at 10:23

## 2022-07-25 RX ADMIN — PROPOFOL 100 MG: 10 INJECTION, EMULSION INTRAVENOUS at 10:27

## 2022-07-25 RX ADMIN — PROPOFOL 100 MG: 10 INJECTION, EMULSION INTRAVENOUS at 10:32

## 2022-07-25 RX ADMIN — PROPOFOL 100 MG: 10 INJECTION, EMULSION INTRAVENOUS at 10:20

## 2022-07-25 ASSESSMENT — PAIN - FUNCTIONAL ASSESSMENT: PAIN_FUNCTIONAL_ASSESSMENT: NONE - DENIES PAIN

## 2022-07-25 NOTE — ANESTHESIA POSTPROCEDURE EVALUATION
Department of Anesthesiology  Postprocedure Note    Patient: Silviano Mak  MRN: [id number]  YOB: 1970  Date of evaluation: 7/25/2022      Procedure Summary     Date: 07/25/22 Room / Location: [facility] OR 01 / [facility]    Anesthesia Start: 1017 Anesthesia Stop: 1048    Procedure: COLORECTAL CANCER SCREENING, NOT HIGH RISK Diagnosis:       Colon cancer screening      (Colon cancer screening [Z12.11])    Surgeons: Jhonathan Potter MD Responsible Provider: FANNY Giraldo CRNA    Anesthesia Type: MAC ASA Status: 2          Anesthesia Type: No value filed.     Danyell Phase I: Danyell Score: 10    Danyell Phase II:        Anesthesia Post Evaluation    Patient location during evaluation: PACU  Level of consciousness: awake  Pain score: 0  Airway patency: patent  Nausea & Vomiting: no vomiting and no nausea  Complications: no  Cardiovascular status: hemodynamically stable  Respiratory status: acceptable  Hydration status: stable

## 2022-07-25 NOTE — DISCHARGE INSTRUCTIONS
Lower Discharge Instructions    Patient Name: Sandy Miles  Patient ID: [id number]  YOB: 1970  Procedure: Gennaro Heller  Referring Physician: [unfilled]  Procedure Date: 7/25/2022    Recommendations:  []   Follow-up appointment with family physician in 4 weeks. [x]   Colonoscopy recommended in 3 years. []   Follow-up appointment with endoscopist in  Reports of your procedure and these recommendations have been sent to:  [unfilled]    Sedative medication given for procedures can slow your reaction time and coordination for many hours. If you receive medications, it is important for your safety to follow the instructions below for the remainder of the day:  BE TAKEN directly home from the center and rest quietly. DO NOT resume normal activities until tomorrow. Do NOT drive, return to work, or operate any machinery or power tools. Do NOT make any important personal or business decisions, sign any legal papers or perform any activity that depends on your full concentration power or mental judgement. Do NOT drink any alcohol or take nerve or sleeping drugs. They add to the effects of the medicine still present in your body.    [] (Checked if a biopsy or polyp removal was performed)  There is a slight risk of bleeding. If you had a biopsy or a polyp removed, we suggest that you follow the instructions below:  Do NOT take aspirin or similar anti-inflammatory medicine for ___ days. Do NOT exercise, jog, or do any heavy lifting or straining for 1 day. Potential common after effects and treatment following the procedure:  Mild abdominal pain, bloating, or excessive gas - rest, eat lightly, and use a heating pad. Redness and/or swelling where the IV was - apply heat and elevate. Symptoms to report to your physician:  Severe abdominal pain or bloating. Chills or fever above 101 degrees occurring within 24 hours after procedure. Large amounts of rectal bleeding that does not stop.  Small amount of rectal bleeding is not serious especially if hemorrhoids are present. Unable to keep down food or drink. IV site stays red and swollen for more than 2 days. In the case of an emergency, please go to the emergency room. If you are not having an emergency but are having some of the above symptoms please call the doctor's office at:  Dr. Rajan Palacios [phone]   @[email or web address]@      I have read and understand the above instructions:            ____________________________         ____________________________  Patient or Patient Rep. Signature   Witness Signature      Date: 7/25/2022  Time:

## 2022-07-25 NOTE — ANESTHESIA PRE PROCEDURE
Department of Anesthesiology  Preprocedure Note       Name:  Marcos Carter   Age:  49 y.o.  :  1970                                          MRN:  [id number]         Date:  2022      Surgeon: Latesha Boucher):  Leslie Harry MD    Procedure: Procedure(s):  COLORECTAL CANCER SCREENING, NOT HIGH RISK    Medications prior to admission:   Prior to Admission medications    Medication Sig Start Date End Date Taking? Authorizing Provider   glipiZIDE-metFORMIN (METAGLIP) 2.5-500 MG per tablet Take 1 tablet by mouth 2 times daily (before meals) 22   FANNY Lucio CNP   blood glucose monitor kit and supplies Dispense sufficient amount for indicated testing frequency plus additional to accommodate PRN testing needs. Dispense all needed supplies to include: monitor, strips, lancing device, lancets, control solutions, alcohol swabs. 21   FANNY Lucio CNP   blood glucose monitor strips Test 1 times a day & as needed for symptoms of irregular blood glucose. Dispense sufficient amount for indicated testing frequency plus additional to accommodate PRN testing needs. 21   FANNY Lucoi CNP   Lancets MISC DX: diabetes mellitus. Use 1-3 time(s) daily - Ok to substitute per insurance (1 each = 1 box) 21   FANNY Lucio CNP       Current medications:    No current facility-administered medications for this encounter. Allergies:     Allergies   Allergen Reactions    Penicillins Rash       Problem List:    Patient Active Problem List   Diagnosis Code    New onset type 2 diabetes mellitus ([address] E11.9    Acute midline low back pain with right-sided sciatica M54.41    Hydronephrosis of right kidney N13.30    Right ureteral calculus N20.1       Past Medical History:        Diagnosis Date    Diabetes mellitus ([address]        Past Surgical History:        Procedure Laterality Date    ANTERIOR CRUCIATE LIGAMENT REPAIR Right     and MCL    BLADDER SURGERY Right 3/8/2022 CYSTOSCOPY RIGHT DOUBLE J STENT PLACEMENT performed by Rell Luu MD at [address] [place] [address] LITHOTRIPSY Right 4/6/2022    RIGHT   SHOCK WAVE LITHOTRIPSY performed by Rell Luu MD at [address] [place] [address] LITHOTRIPSY N/A 4/27/2022    HOLMIUM LASER LITHOTRIPSY performed by Rell Luu MD at [address] [place] [address] Right 4/27/2022    FLEXIBLE RIGHT URETEROSCOPY, RIGHT DOUBLE J STENT REMOVED performed by Rell Luu MD at [place] [address] History:    Social History     Tobacco Use    Smoking status: Every Day     Packs/day: 1.50     Years: 25.00     Pack years: 37.50     Types: Cigarettes    Smokeless tobacco: Never   Substance Use Topics    Alcohol use: Yes     Comment: socially                                Ready to quit: Not Answered  Counseling given: Not Answered      Vital Signs (Current): There were no vitals filed for this visit.                                            BP Readings from Last 3 Encounters:   06/01/22 124/68   04/27/22 (!) 123/90   04/27/22 109/71       NPO Status:                                                                                 BMI:   Wt Readings from Last 3 Encounters:   06/01/22 236 lb 3.2 oz (107.1 kg)   04/27/22 220 lb (99.8 kg)   04/14/22 236 lb (107 kg)     There is no height or weight on file to calculate BMI.    CBC:   Lab Results   Component Value Date/Time    WBC 7.2 04/27/2022 07:58 AM    RBC 5.04 04/27/2022 07:58 AM    HGB 15.1 04/27/2022 07:58 AM    HCT 45.0 04/27/2022 07:58 AM    MCV 89.3 04/27/2022 07:58 AM    RDW 14.0 04/27/2022 07:58 AM     04/27/2022 07:58 AM       CMP:   Lab Results   Component Value Date/Time     04/27/2022 07:57 AM    K 4.7 04/27/2022 07:57 AM     04/27/2022 07:57 AM    CO2 21 04/27/2022 07:57 AM    BUN 15 04/27/2022 07:57 AM    CREATININE 0.84 04/27/2022 07:57 AM    GFRAA >60.0 04/27/2022 07:57 AM    LABGLOM >60.0 04/27/2022 07:57 AM    GLUCOSE 118 04/27/2022 07:57 AM    PROT 7.5 03/07/2022 10:30 AM CALCIUM 9.0 04/27/2022 07:57 AM    BILITOT 0.6 03/07/2022 10:30 AM    ALKPHOS 55 03/07/2022 10:30 AM    AST 17 03/07/2022 10:30 AM    ALT 17 03/07/2022 10:30 AM       POC Tests: No results for input(s): POCGLU, POCNA, POCK, POCCL, POCBUN, POCHEMO, POCHCT in the last 72 hours. Coags:   Lab Results   Component Value Date/Time    PROTIME 13.3 03/07/2022 10:28 AM    INR 1.0 03/07/2022 10:28 AM       HCG (If Applicable): No results found for: PREGTESTUR, PREGSERUM, HCG, HCGQUANT     ABGs: No results found for: PHART, PO2ART, FKL6DQY, TIE1WOY, BEART, D8GWFDTR     Type & Screen (If Applicable):  No results found for: LABABO, LABRH    Drug/Infectious Status (If Applicable):  No results found for: HIV, HEPCAB    COVID-19 Screening (If Applicable): No results found for: COVID19        Anesthesia Evaluation    Airway: Mallampati: II  TM distance: >3 FB   Neck ROM: full  Mouth opening: > = 3 FB   Dental:          Pulmonary:Negative Pulmonary ROS and normal exam                               Cardiovascular:Negative CV ROS                      Neuro/Psych:   (+) neuromuscular disease:,             GI/Hepatic/Renal: Neg GI/Hepatic/Renal ROS            Endo/Other:    (+) Diabetes, . Abdominal:             Vascular: negative vascular ROS. Other Findings:           Anesthesia Plan      MAC     ASA 2       Induction: intravenous. Anesthetic plan and risks discussed with patient.       Plan discussed with surgical team.                    FANNY Garay - CRNA   7/25/2022

## 2022-07-25 NOTE — H&P
Patient Name: Stephie Short  : 1970  MRN: [id number]  DATE: 22      ENDOSCOPY  History and Physical    Procedure:    [] Diagnostic Colonoscopy       [x] Screening Colonoscopy  [] EGD      [] ERCP      [] EUS       [] Other    [x] Previous office notes/History and Physical reviewed from the patients chart. Please see EMR for further details of HPI. I have examined the patient's status immediately prior to the procedure and:      Indications/HPI:    []Abdominal Pain  []Cancer- GI/Lung  []Fhx of colon CA/polyps  []History of Polyps  []Wolfs   []Melena  []Abnormal Imaging  []Dysphagia    []Persistent Pneumonia  []Anemia  []Food Impaction  []History of Polyps  []GI Bleed  []Pulmonary nodule/Mass  []Change in bowel habits []Heartburn/Reflux  []Rectal Bleed (BRBPR)  []Chest Pain - Non Cardiac []Heme (+) Stoo  l[]Ulcers  []Constipation  []Hemoptysis   []Varices  []Diarrhea  []Hypoxemia  []Nausea/Vomiting  []Screening   []Crohns/Colitis  []Other:     Anesthesia:   [x] MAC [] Moderate Sedation   [] General   [] None     ROS: 12 pt Review of Symptoms was negative unless mentioned above    Medications:   Prior to Admission medications    Medication Sig Start Date End Date Taking? Authorizing Provider   glipiZIDE-metFORMIN (METAGLIP) 2.5-500 MG per tablet Take 1 tablet by mouth 2 times daily (before meals) 22   FANNY Singh CNP   blood glucose monitor kit and supplies Dispense sufficient amount for indicated testing frequency plus additional to accommodate PRN testing needs. Dispense all needed supplies to include: monitor, strips, lancing device, lancets, control solutions, alcohol swabs. 21   FANNY Singh CNP   blood glucose monitor strips Test 1 times a day & as needed for symptoms of irregular blood glucose. Dispense sufficient amount for indicated testing frequency plus additional to accommodate PRN testing needs.  21   FANNY Singh CNP   Lancets MISC DX: diabetes mellitus. Use 1-3 time(s) daily - Ok to substitute per insurance (1 each = 1 box) 9/8/21   FANNY Mares - CNP       Allergies: Allergies   Allergen Reactions    Penicillins Rash        History of allergic reaction to anesthesia:  No    Past Medical History:  Past Medical History:   Diagnosis Date    Diabetes mellitus ([facility] [address]        Past Surgical History:  Past Surgical History:   Procedure Laterality Date    ANTERIOR CRUCIATE LIGAMENT REPAIR Right     and MCL    BLADDER SURGERY Right 3/8/2022    CYSTOSCOPY RIGHT DOUBLE J STENT PLACEMENT performed by Pasquale Terry MD at [place] Right 4/6/2022    RIGHT   SHOCK WAVE LITHOTRIPSY performed by [facility]quale [facility] MD at [facility]    LITHOTRIPSY N/A 4/27/2022    HOLMIUM LASER LITHOTRIPSY performed by Pasquale Terry MD at [address] [place] [address] Right 4/27/2022    FLEXIBLE RIGHT URETEROSCOPY, RIGHT DOUBLE J STENT REMOVED performed by Pasquale Terry MD at [place] [address] History:  Social History     Tobacco Use    Smoking status: Every Day     Packs/day: 1.50     Years: 25.00     Pack years: 37.50     Types: Cigarettes    Smokeless tobacco: Never   Vaping Use    Vaping Use: Never used   Substance Use Topics    Alcohol use: Yes     Comment: socially    Drug use: Not Currently     Types: Marijuana [place] Seema)       Vital Signs:   Vitals:    07/25/22 0930   BP: 129/82   Pulse: 79   Resp: 18   Temp: 97.3 °F (36.3 °C)   SpO2: 95%        Physical Exam:  Cardiac:  [x]WNL  []Comments:  Pulmonary:  [x]WNL   []Comments:   Neuro/Mental Status:  [x]WNL  []Comments:  Abdominal:  [x]WNL    []Comments:  Other:   []WNL  []Comments:    Informed Consent:  The risks and benefits of the procedure have been discussed with either the patient or if they cannot consent, their representative. Assessment:  Patient examined and appropriate for planned sedation and procedure. Plan:  Proceed with planned sedation and procedure as above.     Apollo Casillas MD  10:18 AM

## 2022-08-24 PROBLEM — Z12.11 COLON CANCER SCREENING: Status: RESOLVED | Noted: 2022-07-25 | Resolved: 2022-08-24

## 2022-10-03 DIAGNOSIS — E11.9 NEW ONSET TYPE 2 DIABETES MELLITUS (HCC): ICD-10-CM

## 2022-10-03 RX ORDER — GLIPIZIDE AND METFORMIN HCL 2.5; 5 MG/1; MG/1
1 TABLET, FILM COATED ORAL
Qty: 60 TABLET | Refills: 3 | Status: SHIPPED | OUTPATIENT
Start: 2022-10-03

## 2022-12-20 ENCOUNTER — OFFICE VISIT (OUTPATIENT)
Dept: FAMILY MEDICINE CLINIC | Age: 52
End: 2022-12-20
Payer: COMMERCIAL

## 2022-12-20 VITALS
HEART RATE: 82 BPM | TEMPERATURE: 97.9 F | SYSTOLIC BLOOD PRESSURE: 138 MMHG | DIASTOLIC BLOOD PRESSURE: 86 MMHG | OXYGEN SATURATION: 98 % | RESPIRATION RATE: 18 BRPM | BODY MASS INDEX: 35.36 KG/M2 | WEIGHT: 247 LBS | HEIGHT: 70 IN

## 2022-12-20 DIAGNOSIS — Z12.5 SCREENING FOR PROSTATE CANCER: ICD-10-CM

## 2022-12-20 DIAGNOSIS — E11.9 TYPE 2 DIABETES MELLITUS WITHOUT COMPLICATION, WITHOUT LONG-TERM CURRENT USE OF INSULIN (HCC): Primary | ICD-10-CM

## 2022-12-20 DIAGNOSIS — Z87.891 PERSONAL HISTORY OF TOBACCO USE: ICD-10-CM

## 2022-12-20 LAB — HBA1C MFR BLD: 6.3 %

## 2022-12-20 PROCEDURE — G8427 DOCREV CUR MEDS BY ELIG CLIN: HCPCS | Performed by: NURSE PRACTITIONER

## 2022-12-20 PROCEDURE — 4004F PT TOBACCO SCREEN RCVD TLK: CPT | Performed by: NURSE PRACTITIONER

## 2022-12-20 PROCEDURE — 2022F DILAT RTA XM EVC RTNOPTHY: CPT | Performed by: NURSE PRACTITIONER

## 2022-12-20 PROCEDURE — G0296 VISIT TO DETERM LDCT ELIG: HCPCS | Performed by: NURSE PRACTITIONER

## 2022-12-20 PROCEDURE — 83036 HEMOGLOBIN GLYCOSYLATED A1C: CPT | Performed by: NURSE PRACTITIONER

## 2022-12-20 PROCEDURE — 3044F HG A1C LEVEL LT 7.0%: CPT | Performed by: NURSE PRACTITIONER

## 2022-12-20 PROCEDURE — 3017F COLORECTAL CA SCREEN DOC REV: CPT | Performed by: NURSE PRACTITIONER

## 2022-12-20 PROCEDURE — G8417 CALC BMI ABV UP PARAM F/U: HCPCS | Performed by: NURSE PRACTITIONER

## 2022-12-20 PROCEDURE — G8484 FLU IMMUNIZE NO ADMIN: HCPCS | Performed by: NURSE PRACTITIONER

## 2022-12-20 PROCEDURE — 99213 OFFICE O/P EST LOW 20 MIN: CPT | Performed by: NURSE PRACTITIONER

## 2022-12-20 SDOH — ECONOMIC STABILITY: FOOD INSECURITY: WITHIN THE PAST 12 MONTHS, YOU WORRIED THAT YOUR FOOD WOULD RUN OUT BEFORE YOU GOT MONEY TO BUY MORE.: NEVER TRUE

## 2022-12-20 SDOH — ECONOMIC STABILITY: FOOD INSECURITY: WITHIN THE PAST 12 MONTHS, THE FOOD YOU BOUGHT JUST DIDN'T LAST AND YOU DIDN'T HAVE MONEY TO GET MORE.: NEVER TRUE

## 2022-12-20 ASSESSMENT — ENCOUNTER SYMPTOMS
BACK PAIN: 0
EYES NEGATIVE: 1
GASTROINTESTINAL NEGATIVE: 1
COUGH: 0
WHEEZING: 0
SHORTNESS OF BREATH: 0

## 2022-12-20 ASSESSMENT — SOCIAL DETERMINANTS OF HEALTH (SDOH): HOW HARD IS IT FOR YOU TO PAY FOR THE VERY BASICS LIKE FOOD, HOUSING, MEDICAL CARE, AND HEATING?: NOT HARD AT ALL

## 2022-12-20 NOTE — PATIENT INSTRUCTIONS

## 2022-12-28 ENCOUNTER — TELEPHONE (OUTPATIENT)
Dept: CARDIOTHORACIC SURGERY | Age: 52
End: 2022-12-28

## 2022-12-28 NOTE — TELEPHONE ENCOUNTER
Physician documentation on smoking history and CT Lung Screening reviewed. All required documentation complete. Patient is a current every day smoker with a 37.5 pack year history ( 1.5 ppd x 25 years) per physician documentation.

## 2022-12-29 DIAGNOSIS — Z12.5 SCREENING FOR PROSTATE CANCER: ICD-10-CM

## 2022-12-29 DIAGNOSIS — E11.9 TYPE 2 DIABETES MELLITUS WITHOUT COMPLICATION, WITHOUT LONG-TERM CURRENT USE OF INSULIN (HCC): ICD-10-CM

## 2022-12-29 LAB
ALBUMIN SERPL-MCNC: 4.4 G/DL (ref 3.5–4.6)
ALP BLD-CCNC: 59 U/L (ref 35–104)
ALT SERPL-CCNC: 22 U/L (ref 0–41)
ANION GAP SERPL CALCULATED.3IONS-SCNC: 14 MEQ/L (ref 9–15)
AST SERPL-CCNC: 18 U/L (ref 0–40)
BASOPHILS ABSOLUTE: 0 K/UL (ref 0–0.2)
BASOPHILS RELATIVE PERCENT: 0.6 %
BILIRUB SERPL-MCNC: <0.2 MG/DL (ref 0.2–0.7)
BUN BLDV-MCNC: 15 MG/DL (ref 6–20)
CALCIUM SERPL-MCNC: 9.5 MG/DL (ref 8.5–9.9)
CHLORIDE BLD-SCNC: 102 MEQ/L (ref 95–107)
CHOLESTEROL, TOTAL: 196 MG/DL (ref 0–199)
CO2: 22 MEQ/L (ref 20–31)
CREAT SERPL-MCNC: 0.93 MG/DL (ref 0.7–1.2)
EOSINOPHILS ABSOLUTE: 0.2 K/UL (ref 0–0.7)
EOSINOPHILS RELATIVE PERCENT: 2.7 %
GFR SERPL CREATININE-BSD FRML MDRD: >60 ML/MIN/{1.73_M2}
GLOBULIN: 2.8 G/DL (ref 2.3–3.5)
GLUCOSE BLD-MCNC: 111 MG/DL (ref 70–99)
HCT VFR BLD CALC: 48.3 % (ref 42–52)
HDLC SERPL-MCNC: 38 MG/DL (ref 40–59)
HEMOGLOBIN: 16.5 G/DL (ref 14–18)
LDL CHOLESTEROL CALCULATED: 119 MG/DL (ref 0–129)
LYMPHOCYTES ABSOLUTE: 2.4 K/UL (ref 1–4.8)
LYMPHOCYTES RELATIVE PERCENT: 29.8 %
MCH RBC QN AUTO: 30.7 PG (ref 27–31.3)
MCHC RBC AUTO-ENTMCNC: 34.3 % (ref 33–37)
MCV RBC AUTO: 89.4 FL (ref 79–92.2)
MONOCYTES ABSOLUTE: 0.6 K/UL (ref 0.2–0.8)
MONOCYTES RELATIVE PERCENT: 7 %
NEUTROPHILS ABSOLUTE: 4.8 K/UL (ref 1.4–6.5)
NEUTROPHILS RELATIVE PERCENT: 59.9 %
PDW BLD-RTO: 13.7 % (ref 11.5–14.5)
PLATELET # BLD: 263 K/UL (ref 130–400)
POTASSIUM SERPL-SCNC: 4.6 MEQ/L (ref 3.4–4.9)
PROSTATE SPECIFIC ANTIGEN: 0.45 NG/ML (ref 0–4)
RBC # BLD: 5.39 M/UL (ref 4.7–6.1)
SODIUM BLD-SCNC: 138 MEQ/L (ref 135–144)
TOTAL PROTEIN: 7.2 G/DL (ref 6.3–8)
TRIGL SERPL-MCNC: 193 MG/DL (ref 0–150)
WBC # BLD: 8 K/UL (ref 4.8–10.8)

## 2023-01-03 ENCOUNTER — HOSPITAL ENCOUNTER (OUTPATIENT)
Dept: CT IMAGING | Age: 53
Discharge: HOME | End: 2023-01-05
Payer: COMMERCIAL

## 2023-01-03 DIAGNOSIS — Z87.891 PERSONAL HISTORY OF TOBACCO USE: ICD-10-CM

## 2023-01-03 PROCEDURE — 71271 CT THORAX LUNG CANCER SCR C-: CPT

## 2023-06-20 ENCOUNTER — OFFICE VISIT (OUTPATIENT)
Dept: FAMILY MEDICINE CLINIC | Age: 53
End: 2023-06-20
Payer: COMMERCIAL

## 2023-06-20 VITALS
TEMPERATURE: 98 F | HEIGHT: 70 IN | SYSTOLIC BLOOD PRESSURE: 134 MMHG | WEIGHT: 246 LBS | OXYGEN SATURATION: 97 % | RESPIRATION RATE: 18 BRPM | HEART RATE: 95 BPM | DIASTOLIC BLOOD PRESSURE: 78 MMHG | BODY MASS INDEX: 35.22 KG/M2

## 2023-06-20 DIAGNOSIS — E11.9 TYPE 2 DIABETES MELLITUS WITHOUT COMPLICATION, WITHOUT LONG-TERM CURRENT USE OF INSULIN (HCC): Primary | ICD-10-CM

## 2023-06-20 DIAGNOSIS — D22.9 ATYPICAL MOLE: ICD-10-CM

## 2023-06-20 PROCEDURE — 3017F COLORECTAL CA SCREEN DOC REV: CPT | Performed by: NURSE PRACTITIONER

## 2023-06-20 PROCEDURE — 4004F PT TOBACCO SCREEN RCVD TLK: CPT | Performed by: NURSE PRACTITIONER

## 2023-06-20 PROCEDURE — 99213 OFFICE O/P EST LOW 20 MIN: CPT | Performed by: NURSE PRACTITIONER

## 2023-06-20 PROCEDURE — 83037 HB GLYCOSYLATED A1C HOME DEV: CPT | Performed by: NURSE PRACTITIONER

## 2023-06-20 PROCEDURE — G8427 DOCREV CUR MEDS BY ELIG CLIN: HCPCS | Performed by: NURSE PRACTITIONER

## 2023-06-20 PROCEDURE — 2022F DILAT RTA XM EVC RTNOPTHY: CPT | Performed by: NURSE PRACTITIONER

## 2023-06-20 PROCEDURE — G8417 CALC BMI ABV UP PARAM F/U: HCPCS | Performed by: NURSE PRACTITIONER

## 2023-06-20 PROCEDURE — 3046F HEMOGLOBIN A1C LEVEL >9.0%: CPT | Performed by: NURSE PRACTITIONER

## 2023-06-20 RX ORDER — GLIPIZIDE AND METFORMIN HCL 2.5; 5 MG/1; MG/1
1 TABLET, FILM COATED ORAL
Qty: 60 TABLET | Refills: 5 | Status: SHIPPED | OUTPATIENT
Start: 2023-06-20

## 2023-06-20 SDOH — ECONOMIC STABILITY: FOOD INSECURITY: WITHIN THE PAST 12 MONTHS, YOU WORRIED THAT YOUR FOOD WOULD RUN OUT BEFORE YOU GOT MONEY TO BUY MORE.: NEVER TRUE

## 2023-06-20 SDOH — ECONOMIC STABILITY: FOOD INSECURITY: WITHIN THE PAST 12 MONTHS, THE FOOD YOU BOUGHT JUST DIDN'T LAST AND YOU DIDN'T HAVE MONEY TO GET MORE.: NEVER TRUE

## 2023-06-20 SDOH — ECONOMIC STABILITY: INCOME INSECURITY: HOW HARD IS IT FOR YOU TO PAY FOR THE VERY BASICS LIKE FOOD, HOUSING, MEDICAL CARE, AND HEATING?: NOT HARD AT ALL

## 2023-06-20 SDOH — ECONOMIC STABILITY: HOUSING INSECURITY
IN THE LAST 12 MONTHS, WAS THERE A TIME WHEN YOU DID NOT HAVE A STEADY PLACE TO SLEEP OR SLEPT IN A SHELTER (INCLUDING NOW)?: NO

## 2023-06-20 ASSESSMENT — PATIENT HEALTH QUESTIONNAIRE - PHQ9
SUM OF ALL RESPONSES TO PHQ QUESTIONS 1-9: 0
SUM OF ALL RESPONSES TO PHQ9 QUESTIONS 1 & 2: 0
2. FEELING DOWN, DEPRESSED OR HOPELESS: 0
SUM OF ALL RESPONSES TO PHQ QUESTIONS 1-9: 0
1. LITTLE INTEREST OR PLEASURE IN DOING THINGS: 0
SUM OF ALL RESPONSES TO PHQ QUESTIONS 1-9: 0
SUM OF ALL RESPONSES TO PHQ QUESTIONS 1-9: 0

## 2023-06-20 NOTE — PROGRESS NOTES
Unknown    Lack of Transportation (Medical): Not on file    Lack of Transportation (Non-Medical): No   Physical Activity: Not on file   Stress: Not on file   Social Connections: Not on file   Intimate Partner Violence: Not on file   Housing Stability: Unknown    Unable to Pay for Housing in the Last Year: Not on file    Number of Places Lived in the Last Year: Not on file    Unstable Housing in the Last Year: No     Allergies:  Penicillins    Review of Systems   Constitutional:  Negative for activity change, appetite change, chills, diaphoresis, fatigue, fever and unexpected weight change. HENT: Negative. Eyes: Negative. Respiratory:  Negative for cough, shortness of breath and wheezing. Cardiovascular:  Negative for chest pain, palpitations and leg swelling. Gastrointestinal: Negative. Endocrine: Negative for polydipsia, polyphagia and polyuria. Genitourinary: Negative. Musculoskeletal:  Negative for back pain, gait problem and joint swelling. Skin: Negative. Neurological:  Negative for dizziness, syncope, weakness, light-headedness and headaches. Psychiatric/Behavioral: Negative. Objective:    /78 (Site: Right Upper Arm, Position: Sitting, Cuff Size: Medium Adult)   Pulse 95   Temp 98 °F (36.7 °C) (Infrared)   Resp 18   Ht 5' 10\" (1.778 m)   Wt 246 lb (111.6 kg)   SpO2 97%   BMI 35.30 kg/m²     Physical Exam  Constitutional:       General: He is not in acute distress. HENT:      Head: Normocephalic and atraumatic. Right Ear: External ear normal.      Left Ear: External ear normal.      Mouth/Throat:      Mouth: Mucous membranes are moist.   Eyes:      Conjunctiva/sclera: Conjunctivae normal.   Cardiovascular:      Rate and Rhythm: Normal rate and regular rhythm. Heart sounds: Normal heart sounds. Pulmonary:      Effort: Pulmonary effort is normal. No respiratory distress. Breath sounds: Normal breath sounds.    Musculoskeletal:      Cervical back:

## 2023-06-21 ASSESSMENT — ENCOUNTER SYMPTOMS
WHEEZING: 0
EYES NEGATIVE: 1
COUGH: 0
GASTROINTESTINAL NEGATIVE: 1
BACK PAIN: 0
SHORTNESS OF BREATH: 0

## 2023-07-03 NOTE — PROGRESS NOTES
Chaperone for Intimate Exam    1. Was chaperone offered as part of the rooming process? offered, declined   2. If Chaperone is declined by patient, NA: chaperone was available and exam completed  3.  Chaperone is n/a Presenting with WBC of 12K that quickly down trended to normal  -Urinalysis negative for WBC, +nitrite, +LE, bacteria too numerous to count  -Urine culture growing klebsiella pneumoniae  -No dysuria or fevers, VSS  -No treatment indicated

## 2024-01-16 ENCOUNTER — TELEMEDICINE (OUTPATIENT)
Dept: FAMILY MEDICINE CLINIC | Age: 54
End: 2024-01-16
Payer: COMMERCIAL

## 2024-01-16 DIAGNOSIS — E11.9 TYPE 2 DIABETES MELLITUS WITHOUT COMPLICATION, WITHOUT LONG-TERM CURRENT USE OF INSULIN (HCC): Primary | ICD-10-CM

## 2024-01-16 DIAGNOSIS — U07.1 COVID-19: ICD-10-CM

## 2024-01-16 DIAGNOSIS — Z12.5 SCREENING FOR PROSTATE CANCER: ICD-10-CM

## 2024-01-16 DIAGNOSIS — Z11.59 NEED FOR HEPATITIS C SCREENING TEST: ICD-10-CM

## 2024-01-16 DIAGNOSIS — Z11.4 SCREENING FOR HIV (HUMAN IMMUNODEFICIENCY VIRUS): ICD-10-CM

## 2024-01-16 DIAGNOSIS — Z72.0 TOBACCO ABUSE: ICD-10-CM

## 2024-01-16 PROCEDURE — 2022F DILAT RTA XM EVC RTNOPTHY: CPT | Performed by: NURSE PRACTITIONER

## 2024-01-16 PROCEDURE — 3046F HEMOGLOBIN A1C LEVEL >9.0%: CPT | Performed by: NURSE PRACTITIONER

## 2024-01-16 PROCEDURE — 3017F COLORECTAL CA SCREEN DOC REV: CPT | Performed by: NURSE PRACTITIONER

## 2024-01-16 PROCEDURE — 99214 OFFICE O/P EST MOD 30 MIN: CPT | Performed by: NURSE PRACTITIONER

## 2024-01-16 PROCEDURE — G8427 DOCREV CUR MEDS BY ELIG CLIN: HCPCS | Performed by: NURSE PRACTITIONER

## 2024-01-16 RX ORDER — VARENICLINE TARTRATE 0.5 MG/1
.5-1 TABLET, FILM COATED ORAL SEE ADMIN INSTRUCTIONS
Qty: 57 TABLET | Refills: 0 | Status: SHIPPED | OUTPATIENT
Start: 2024-01-16

## 2024-01-16 RX ORDER — VARENICLINE TARTRATE 1 MG/1
1 TABLET, FILM COATED ORAL 2 TIMES DAILY
Qty: 60 TABLET | Refills: 5 | Status: SHIPPED | OUTPATIENT
Start: 2024-01-16

## 2024-01-16 ASSESSMENT — ENCOUNTER SYMPTOMS
SORE THROAT: 0
NAUSEA: 0
VOMITING: 0
SWOLLEN GLANDS: 0
SHORTNESS OF BREATH: 0
WHEEZING: 0
CHANGE IN BOWEL HABIT: 0
COUGH: 1
ABDOMINAL PAIN: 0
TROUBLE SWALLOWING: 0
VISUAL CHANGE: 0
FACIAL SWELLING: 0
DIARRHEA: 0
CHEST TIGHTNESS: 0
SINUS PRESSURE: 0
RHINORRHEA: 1

## 2024-01-16 ASSESSMENT — PATIENT HEALTH QUESTIONNAIRE - PHQ9
1. LITTLE INTEREST OR PLEASURE IN DOING THINGS: 0
SUM OF ALL RESPONSES TO PHQ QUESTIONS 1-9: 0
SUM OF ALL RESPONSES TO PHQ9 QUESTIONS 1 & 2: 0
2. FEELING DOWN, DEPRESSED OR HOPELESS: 0
SUM OF ALL RESPONSES TO PHQ QUESTIONS 1-9: 0

## 2024-01-16 NOTE — PROGRESS NOTES
Sage Rojas (:  1970) is a Established patient, presenting virtually for evaluation of the following:    Assessment & Plan   Below is the assessment and plan developed based on review of pertinent history, physical exam, labs, studies, and medications.  1. Type 2 diabetes mellitus without complication, without long-term current use of insulin (HCC)  -     CBC with Auto Differential; Future  -     Comprehensive Metabolic Panel; Future  -     Lipid Panel; Future  -     Hemoglobin A1C; Future  -     Microalbumin / Creatinine Urine Ratio; Future  2. Tobacco abuse  -     CT lung screen [Initial/Annual]; Future  -     varenicline (CHANTIX) 0.5 MG tablet; Take 1-2 tablets by mouth See Admin Instructions 0.5mg DAILY for 3 days followed by 0.5mg TWICE DAILY for 4 days followed by 1mg TWICE DAILY, Disp-57 tablet, R-0Normal  -     varenicline (CHANTIX) 1 MG tablet; Take 1 tablet by mouth 2 times daily, Disp-60 tablet, R-5Normal  -     nicotine polacrilex (NICORETTE) 4 MG gum; Take 1 each by mouth as needed for Smoking cessation, Disp-110 each, R-3Normal  3. COVID-19  4. Screening for prostate cancer  -     PSA Screening; Future  5. Screening for HIV (human immunodeficiency virus)  -     HIV Screen; Future  6. Need for hepatitis C screening test  -     Hepatitis C Antibody; Future    Return in about 3 months (around 2024).       Subjective   Diabetes  He presents for his follow-up diabetic visit. He has type 2 diabetes mellitus. Pertinent negatives for hypoglycemia include no confusion, dizziness or headaches. Associated symptoms include fatigue. Pertinent negatives for diabetes include no chest pain, no visual change and no weakness. Risk factors for coronary artery disease include diabetes mellitus, male sex, obesity and tobacco exposure. Current diabetic treatment includes oral agent (dual therapy). He is compliant with treatment all of the time. His weight is stable. He monitors blood glucose at home 1-2 x

## 2024-04-30 ENCOUNTER — HOSPITAL ENCOUNTER (OUTPATIENT)
Dept: CT IMAGING | Age: 54
Discharge: HOME | End: 2024-05-02
Payer: COMMERCIAL

## 2024-04-30 DIAGNOSIS — Z12.5 SCREENING FOR PROSTATE CANCER: ICD-10-CM

## 2024-04-30 DIAGNOSIS — Z72.0 TOBACCO ABUSE: ICD-10-CM

## 2024-04-30 DIAGNOSIS — Z11.4 SCREENING FOR HIV (HUMAN IMMUNODEFICIENCY VIRUS): ICD-10-CM

## 2024-04-30 DIAGNOSIS — Z11.59 NEED FOR HEPATITIS C SCREENING TEST: ICD-10-CM

## 2024-04-30 DIAGNOSIS — E11.9 TYPE 2 DIABETES MELLITUS WITHOUT COMPLICATION, WITHOUT LONG-TERM CURRENT USE OF INSULIN (HCC): ICD-10-CM

## 2024-04-30 LAB
ALBUMIN SERPL-MCNC: 4.4 G/DL (ref 3.5–4.6)
ALP SERPL-CCNC: 64 U/L (ref 35–104)
ALT SERPL-CCNC: 44 U/L (ref 0–41)
ANION GAP SERPL CALCULATED.3IONS-SCNC: 17 MEQ/L (ref 9–15)
AST SERPL-CCNC: 27 U/L (ref 0–40)
BASOPHILS # BLD: 0.1 K/UL (ref 0–0.2)
BASOPHILS NFR BLD: 0.7 %
BILIRUB SERPL-MCNC: 0.3 MG/DL (ref 0.2–0.7)
BUN SERPL-MCNC: 16 MG/DL (ref 6–20)
CALCIUM SERPL-MCNC: 9.6 MG/DL (ref 8.5–9.9)
CHLORIDE SERPL-SCNC: 103 MEQ/L (ref 95–107)
CHOLEST SERPL-MCNC: 206 MG/DL (ref 0–199)
CO2 SERPL-SCNC: 20 MEQ/L (ref 20–31)
CREAT SERPL-MCNC: 1.01 MG/DL (ref 0.7–1.2)
CREAT UR-MCNC: 159.1 MG/DL
EOSINOPHIL # BLD: 0.2 K/UL (ref 0–0.7)
EOSINOPHIL NFR BLD: 3 %
ERYTHROCYTE [DISTWIDTH] IN BLOOD BY AUTOMATED COUNT: 12.9 % (ref 11.5–14.5)
GLOBULIN SER CALC-MCNC: 3.2 G/DL (ref 2.3–3.5)
GLUCOSE SERPL-MCNC: 153 MG/DL (ref 70–99)
HCT VFR BLD AUTO: 50.3 % (ref 42–52)
HDLC SERPL-MCNC: 36 MG/DL (ref 40–59)
HGB BLD-MCNC: 16.7 G/DL (ref 14–18)
LDLC SERPL CALC-MCNC: 140 MG/DL (ref 0–129)
LYMPHOCYTES # BLD: 2 K/UL (ref 1–4.8)
LYMPHOCYTES NFR BLD: 24.7 %
MCH RBC QN AUTO: 29.9 PG (ref 27–31.3)
MCHC RBC AUTO-ENTMCNC: 33.2 % (ref 33–37)
MCV RBC AUTO: 90.1 FL (ref 79–92.2)
MICROALBUMIN UR-MCNC: 2.1 MG/DL
MICROALBUMIN/CREAT UR-RTO: 13.2 MG/G (ref 0–30)
MONOCYTES # BLD: 0.5 K/UL (ref 0.2–0.8)
MONOCYTES NFR BLD: 6.2 %
NEUTROPHILS # BLD: 5.3 K/UL (ref 1.4–6.5)
NEUTS SEG NFR BLD: 65 %
PLATELET # BLD AUTO: 272 K/UL (ref 130–400)
POTASSIUM SERPL-SCNC: 4.6 MEQ/L (ref 3.4–4.9)
PROT SERPL-MCNC: 7.6 G/DL (ref 6.3–8)
PSA SERPL-MCNC: 0.48 NG/ML (ref 0–4)
RBC # BLD AUTO: 5.58 M/UL (ref 4.7–6.1)
SODIUM SERPL-SCNC: 140 MEQ/L (ref 135–144)
TRIGL SERPL-MCNC: 151 MG/DL (ref 0–150)
WBC # BLD AUTO: 8.1 K/UL (ref 4.8–10.8)

## 2024-04-30 PROCEDURE — 71271 CT THORAX LUNG CANCER SCR C-: CPT

## 2024-05-01 LAB
ESTIMATED AVERAGE GLUCOSE: 154 MG/DL
HBA1C MFR BLD: 7 % (ref 4–6)
HEPATITIS C ANTIBODY: NONREACTIVE
HIV AG/AB: NONREACTIVE

## 2024-08-27 DIAGNOSIS — E11.9 TYPE 2 DIABETES MELLITUS WITHOUT COMPLICATION, WITHOUT LONG-TERM CURRENT USE OF INSULIN (HCC): ICD-10-CM

## 2024-08-28 RX ORDER — GLIPIZIDE AND METFORMIN HCL 2.5; 5 MG/1; MG/1
1 TABLET, FILM COATED ORAL
Qty: 180 TABLET | Refills: 1 | Status: SHIPPED | OUTPATIENT
Start: 2024-08-28

## (undated) DEVICE — TUBE SET 96 MM 64 MM H2O PERISTALTIC STD AUX CHANNEL

## (undated) DEVICE — GRASPING FORCEPS: Brand: COOK

## (undated) DEVICE — RENTAL ESWL SERVICES UNILATERAL

## (undated) DEVICE — LABEL MED MINI W/ MARKER

## (undated) DEVICE — ANGLED TIP URETERAL CATHETER WITH BENTSON PTFE WIRE GUIDE: Brand: ANGLED TIP

## (undated) DEVICE — DBD-PACK,CYSTOSCOPY,PK VI,AURORA: Brand: MEDLINE

## (undated) DEVICE — GUIDEWIRE URO L150CM DIA0.035IN TAPR 8CM STR TIP STD SHFT

## (undated) DEVICE — GOWN,SIRUS,POLYRNF,BRTHSLV,XLN/XL,20/CS: Brand: MEDLINE

## (undated) DEVICE — SPONGE GZ W4XL4IN COT 12 PLY TYP VII WVN C FLD DSGN

## (undated) DEVICE — SINGLE ACTION PUMPING SYSTEM

## (undated) DEVICE — GUIDEWIRE URO L145CM DIA0.035IN TIP L7CM S STL PTFE BENT

## (undated) DEVICE — TUBING, SUCTION, 1/4" X 10', STRAIGHT: Brand: MEDLINE

## (undated) DEVICE — SYRINGE MED 10ML LUERLOCK TIP W/O SFTY DISP

## (undated) DEVICE — SKIN PREP TRAY 4 COMPARTM TRAY: Brand: MEDLINE INDUSTRIES, INC.

## (undated) DEVICE — BAG DRAINAGE URIN LIGEMAN W/ ADPT SUCTION HOSE CYSTO URO

## (undated) DEVICE — SINGLE PORT MANIFOLD: Brand: NEPTUNE 2

## (undated) DEVICE — TRAP POLYP BALEEN

## (undated) DEVICE — GLOVE ORANGE PI 8   MSG9080

## (undated) DEVICE — SET,IRRIGATION,CYSTO/TUR: Brand: MEDLINE

## (undated) DEVICE — GOWN,AURORA,NONREINFORCED,LARGE: Brand: MEDLINE

## (undated) DEVICE — CONTAINER,SPECIMEN,OR STERILE,4OZ: Brand: MEDLINE

## (undated) DEVICE — BRUSH ENDO CLN L90.5IN SHTH DIA1.7MM BRIST DIA5-7MM 2-6MM

## (undated) DEVICE — EVACUATOR URO BLDR W/ ADPT UROVAC

## (undated) DEVICE — SNARE ENDOSCP AD L240CM LOOP W10MM SHTH DIA2.4MM RND INSUL

## (undated) DEVICE — FORCEPS BX L240CM JAW DIA2.8MM L CAP W/ NDL MIC MESH TOOTH

## (undated) DEVICE — JELLY,LUBE,STERILE,FLIP TOP,TUBE,2-OZ: Brand: MEDLINE

## (undated) DEVICE — TOWEL,OR,DSP,ST,BLUE,STD,4/PK,20PK/CS: Brand: MEDLINE

## (undated) DEVICE — SINGLE-USE DIGITAL FLEXIBLE URETEROSCOPE: Brand: LITHOVUE

## (undated) DEVICE — ENDO CARRY-ON PROCEDURE KIT: Brand: ENDO CARRY-ON PROCEDURE KIT

## (undated) DEVICE — Device: Brand: ENDO SMARTCAP

## (undated) DEVICE — MULTI-WIRE STONE SWEEPING DEVICE: Brand: LESLIE PARACHUTE